# Patient Record
Sex: FEMALE | URBAN - METROPOLITAN AREA
[De-identification: names, ages, dates, MRNs, and addresses within clinical notes are randomized per-mention and may not be internally consistent; named-entity substitution may affect disease eponyms.]

---

## 2018-04-04 ENCOUNTER — INPATIENT (INPATIENT)
Facility: HOSPITAL | Age: 63
LOS: 0 days | Discharge: HOME | DRG: 247 | End: 2018-04-05
Attending: INTERNAL MEDICINE | Admitting: INTERNAL MEDICINE
Payer: COMMERCIAL

## 2018-04-04 VITALS
OXYGEN SATURATION: 99 % | HEART RATE: 95 BPM | TEMPERATURE: 98 F | RESPIRATION RATE: 18 BRPM | SYSTOLIC BLOOD PRESSURE: 141 MMHG | DIASTOLIC BLOOD PRESSURE: 78 MMHG

## 2018-04-04 DIAGNOSIS — Z90.49 ACQUIRED ABSENCE OF OTHER SPECIFIED PARTS OF DIGESTIVE TRACT: Chronic | ICD-10-CM

## 2018-04-04 DIAGNOSIS — I10 ESSENTIAL (PRIMARY) HYPERTENSION: ICD-10-CM

## 2018-04-04 DIAGNOSIS — Z98.891 HISTORY OF UTERINE SCAR FROM PREVIOUS SURGERY: Chronic | ICD-10-CM

## 2018-04-04 DIAGNOSIS — Z98.890 OTHER SPECIFIED POSTPROCEDURAL STATES: Chronic | ICD-10-CM

## 2018-04-04 DIAGNOSIS — E11.9 TYPE 2 DIABETES MELLITUS WITHOUT COMPLICATIONS: ICD-10-CM

## 2018-04-04 DIAGNOSIS — E83.39 OTHER DISORDERS OF PHOSPHORUS METABOLISM: ICD-10-CM

## 2018-04-04 DIAGNOSIS — I24.9 ACUTE ISCHEMIC HEART DISEASE, UNSPECIFIED: ICD-10-CM

## 2018-04-04 DIAGNOSIS — E78.5 HYPERLIPIDEMIA, UNSPECIFIED: ICD-10-CM

## 2018-04-04 DIAGNOSIS — H40.9 UNSPECIFIED GLAUCOMA: ICD-10-CM

## 2018-04-04 LAB
ALBUMIN SERPL ELPH-MCNC: 4.8 G/DL — SIGNIFICANT CHANGE UP (ref 3.3–5)
ALP SERPL-CCNC: 109 U/L — SIGNIFICANT CHANGE UP (ref 40–120)
ALT FLD-CCNC: 45 U/L — SIGNIFICANT CHANGE UP (ref 10–45)
ANION GAP SERPL CALC-SCNC: 18 MMOL/L — HIGH (ref 5–17)
APTT BLD: 31 SEC — SIGNIFICANT CHANGE UP (ref 27.5–37.4)
AST SERPL-CCNC: 60 U/L — HIGH (ref 10–40)
BASOPHILS NFR BLD AUTO: 0.5 % — SIGNIFICANT CHANGE UP (ref 0–2)
BILIRUB SERPL-MCNC: 1.3 MG/DL — HIGH (ref 0.2–1.2)
BUN SERPL-MCNC: 29 MG/DL — HIGH (ref 7–23)
CALCIUM SERPL-MCNC: 10.4 MG/DL — SIGNIFICANT CHANGE UP (ref 8.4–10.5)
CHLORIDE SERPL-SCNC: 96 MMOL/L — SIGNIFICANT CHANGE UP (ref 96–108)
CHOLEST SERPL-MCNC: 223 MG/DL — HIGH (ref 10–199)
CK MB CFR SERPL CALC: 1.5 NG/ML — SIGNIFICANT CHANGE UP (ref 0–6.7)
CK SERPL-CCNC: 78 U/L — SIGNIFICANT CHANGE UP (ref 25–170)
CO2 SERPL-SCNC: 21 MMOL/L — LOW (ref 22–31)
CREAT SERPL-MCNC: 1.16 MG/DL — SIGNIFICANT CHANGE UP (ref 0.5–1.3)
EOSINOPHIL NFR BLD AUTO: 3.4 % — SIGNIFICANT CHANGE UP (ref 0–6)
GLUCOSE BLDC GLUCOMTR-MCNC: 193 MG/DL — HIGH (ref 70–99)
GLUCOSE BLDC GLUCOMTR-MCNC: 194 MG/DL — HIGH (ref 70–99)
GLUCOSE BLDC GLUCOMTR-MCNC: 332 MG/DL — HIGH (ref 70–99)
GLUCOSE SERPL-MCNC: 202 MG/DL — HIGH (ref 70–99)
HBA1C BLD-MCNC: 8.6 % — HIGH (ref 4–5.6)
HCT VFR BLD CALC: 37.5 % — SIGNIFICANT CHANGE UP (ref 34.5–45)
HDLC SERPL-MCNC: 35 MG/DL — LOW (ref 40–125)
HGB BLD-MCNC: 12.5 G/DL — SIGNIFICANT CHANGE UP (ref 11.5–15.5)
INR BLD: 1.01 — SIGNIFICANT CHANGE UP (ref 0.88–1.16)
LIPID PNL WITH DIRECT LDL SERPL: 63 MG/DL — SIGNIFICANT CHANGE UP
LYMPHOCYTES # BLD AUTO: 35.2 % — SIGNIFICANT CHANGE UP (ref 13–44)
MAGNESIUM SERPL-MCNC: 2.5 MG/DL — SIGNIFICANT CHANGE UP (ref 1.6–2.6)
MCHC RBC-ENTMCNC: 29.3 PG — SIGNIFICANT CHANGE UP (ref 27–34)
MCHC RBC-ENTMCNC: 33.3 G/DL — SIGNIFICANT CHANGE UP (ref 32–36)
MCV RBC AUTO: 87.8 FL — SIGNIFICANT CHANGE UP (ref 80–100)
MONOCYTES NFR BLD AUTO: 8.1 % — SIGNIFICANT CHANGE UP (ref 2–14)
NEUTROPHILS NFR BLD AUTO: 52.8 % — SIGNIFICANT CHANGE UP (ref 43–77)
PHOSPHATE SERPL-MCNC: 4.2 MG/DL — SIGNIFICANT CHANGE UP (ref 2.5–4.5)
PLATELET # BLD AUTO: 369 K/UL — SIGNIFICANT CHANGE UP (ref 150–400)
POTASSIUM SERPL-MCNC: 4.3 MMOL/L — SIGNIFICANT CHANGE UP (ref 3.5–5.3)
POTASSIUM SERPL-SCNC: 4.3 MMOL/L — SIGNIFICANT CHANGE UP (ref 3.5–5.3)
PROT SERPL-MCNC: 8.2 G/DL — SIGNIFICANT CHANGE UP (ref 6–8.3)
PROTHROM AB SERPL-ACNC: 11.2 SEC — SIGNIFICANT CHANGE UP (ref 9.8–12.7)
RBC # BLD: 4.27 M/UL — SIGNIFICANT CHANGE UP (ref 3.8–5.2)
RBC # FLD: 13.5 % — SIGNIFICANT CHANGE UP (ref 10.3–16.9)
SODIUM SERPL-SCNC: 135 MMOL/L — SIGNIFICANT CHANGE UP (ref 135–145)
TOTAL CHOLESTEROL/HDL RATIO MEASUREMENT: 6.4 RATIO — SIGNIFICANT CHANGE UP (ref 3.3–7.1)
TRIGL SERPL-MCNC: 625 MG/DL — HIGH (ref 10–149)
WBC # BLD: 8.3 K/UL — SIGNIFICANT CHANGE UP (ref 3.8–10.5)
WBC # FLD AUTO: 8.3 K/UL — SIGNIFICANT CHANGE UP (ref 3.8–10.5)

## 2018-04-04 PROCEDURE — 92928 PRQ TCAT PLMT NTRAC ST 1 LES: CPT | Mod: LC

## 2018-04-04 PROCEDURE — 93571 IV DOP VEL&/PRESS C FLO 1ST: CPT | Mod: 26,LD

## 2018-04-04 PROCEDURE — 93454 CORONARY ARTERY ANGIO S&I: CPT | Mod: 26,XU

## 2018-04-04 PROCEDURE — 99223 1ST HOSP IP/OBS HIGH 75: CPT

## 2018-04-04 PROCEDURE — 93010 ELECTROCARDIOGRAM REPORT: CPT | Mod: 76

## 2018-04-04 RX ORDER — DEXTROSE 50 % IN WATER 50 %
1 SYRINGE (ML) INTRAVENOUS ONCE
Refills: 0 | Status: DISCONTINUED | OUTPATIENT
Start: 2018-04-04 | End: 2018-04-04

## 2018-04-04 RX ORDER — GLUCAGON INJECTION, SOLUTION 0.5 MG/.1ML
1 INJECTION, SOLUTION SUBCUTANEOUS ONCE
Refills: 0 | Status: DISCONTINUED | OUTPATIENT
Start: 2018-04-04 | End: 2018-04-04

## 2018-04-04 RX ORDER — SODIUM CHLORIDE 9 MG/ML
500 INJECTION INTRAMUSCULAR; INTRAVENOUS; SUBCUTANEOUS
Refills: 0 | Status: DISCONTINUED | OUTPATIENT
Start: 2018-04-04 | End: 2018-04-04

## 2018-04-04 RX ORDER — DEXTROSE 50 % IN WATER 50 %
12.5 SYRINGE (ML) INTRAVENOUS ONCE
Refills: 0 | Status: DISCONTINUED | OUTPATIENT
Start: 2018-04-04 | End: 2018-04-04

## 2018-04-04 RX ORDER — CLOPIDOGREL BISULFATE 75 MG/1
75 TABLET, FILM COATED ORAL DAILY
Refills: 0 | Status: DISCONTINUED | OUTPATIENT
Start: 2018-04-05 | End: 2018-04-05

## 2018-04-04 RX ORDER — SODIUM CHLORIDE 9 MG/ML
1000 INJECTION, SOLUTION INTRAVENOUS
Refills: 0 | Status: DISCONTINUED | OUTPATIENT
Start: 2018-04-04 | End: 2018-04-04

## 2018-04-04 RX ORDER — GLUCAGON INJECTION, SOLUTION 0.5 MG/.1ML
1 INJECTION, SOLUTION SUBCUTANEOUS ONCE
Refills: 0 | Status: DISCONTINUED | OUTPATIENT
Start: 2018-04-04 | End: 2018-04-05

## 2018-04-04 RX ORDER — HEPARIN SODIUM 5000 [USP'U]/ML
7500 INJECTION INTRAVENOUS; SUBCUTANEOUS EVERY 8 HOURS
Refills: 0 | Status: DISCONTINUED | OUTPATIENT
Start: 2018-04-04 | End: 2018-04-05

## 2018-04-04 RX ORDER — SODIUM CHLORIDE 9 MG/ML
500 INJECTION INTRAMUSCULAR; INTRAVENOUS; SUBCUTANEOUS
Refills: 0 | Status: DISCONTINUED | OUTPATIENT
Start: 2018-04-04 | End: 2018-04-05

## 2018-04-04 RX ORDER — ASPIRIN/CALCIUM CARB/MAGNESIUM 324 MG
81 TABLET ORAL DAILY
Refills: 0 | Status: DISCONTINUED | OUTPATIENT
Start: 2018-04-05 | End: 2018-04-05

## 2018-04-04 RX ORDER — DEXTROSE 50 % IN WATER 50 %
25 SYRINGE (ML) INTRAVENOUS ONCE
Refills: 0 | Status: DISCONTINUED | OUTPATIENT
Start: 2018-04-04 | End: 2018-04-04

## 2018-04-04 RX ORDER — DEXTROSE 50 % IN WATER 50 %
1 SYRINGE (ML) INTRAVENOUS ONCE
Refills: 0 | Status: DISCONTINUED | OUTPATIENT
Start: 2018-04-04 | End: 2018-04-05

## 2018-04-04 RX ORDER — LISINOPRIL 2.5 MG/1
40 TABLET ORAL DAILY
Refills: 0 | Status: DISCONTINUED | OUTPATIENT
Start: 2018-04-04 | End: 2018-04-05

## 2018-04-04 RX ORDER — CHLORHEXIDINE GLUCONATE 213 G/1000ML
1 SOLUTION TOPICAL ONCE
Refills: 0 | Status: DISCONTINUED | OUTPATIENT
Start: 2018-04-04 | End: 2018-04-05

## 2018-04-04 RX ORDER — DEXTROSE 50 % IN WATER 50 %
25 SYRINGE (ML) INTRAVENOUS ONCE
Refills: 0 | Status: DISCONTINUED | OUTPATIENT
Start: 2018-04-04 | End: 2018-04-05

## 2018-04-04 RX ORDER — INSULIN LISPRO 100/ML
VIAL (ML) SUBCUTANEOUS
Refills: 0 | Status: DISCONTINUED | OUTPATIENT
Start: 2018-04-04 | End: 2018-04-04

## 2018-04-04 RX ORDER — NITROGLYCERIN 6.5 MG
0.4 CAPSULE, EXTENDED RELEASE ORAL
Refills: 0 | Status: DISCONTINUED | OUTPATIENT
Start: 2018-04-04 | End: 2018-04-05

## 2018-04-04 RX ORDER — SODIUM CHLORIDE 9 MG/ML
250 INJECTION INTRAMUSCULAR; INTRAVENOUS; SUBCUTANEOUS ONCE
Refills: 0 | Status: COMPLETED | OUTPATIENT
Start: 2018-04-04 | End: 2018-04-04

## 2018-04-04 RX ORDER — GEMFIBROZIL 600 MG
600 TABLET ORAL
Refills: 0 | Status: DISCONTINUED | OUTPATIENT
Start: 2018-04-04 | End: 2018-04-05

## 2018-04-04 RX ORDER — CLOPIDOGREL BISULFATE 75 MG/1
225 TABLET, FILM COATED ORAL ONCE
Refills: 0 | Status: COMPLETED | OUTPATIENT
Start: 2018-04-04 | End: 2018-04-04

## 2018-04-04 RX ORDER — SODIUM CHLORIDE 9 MG/ML
1000 INJECTION, SOLUTION INTRAVENOUS
Refills: 0 | Status: DISCONTINUED | OUTPATIENT
Start: 2018-04-04 | End: 2018-04-05

## 2018-04-04 RX ORDER — INSULIN LISPRO 100/ML
1 VIAL (ML) SUBCUTANEOUS
Refills: 0 | Status: DISCONTINUED | OUTPATIENT
Start: 2018-04-04 | End: 2018-04-05

## 2018-04-04 RX ORDER — FAMOTIDINE 10 MG/ML
20 INJECTION INTRAVENOUS
Refills: 0 | Status: DISCONTINUED | OUTPATIENT
Start: 2018-04-04 | End: 2018-04-05

## 2018-04-04 RX ORDER — CHOLECALCIFEROL (VITAMIN D3) 125 MCG
1000 CAPSULE ORAL DAILY
Refills: 0 | Status: DISCONTINUED | OUTPATIENT
Start: 2018-04-04 | End: 2018-04-05

## 2018-04-04 RX ORDER — LATANOPROST 0.05 MG/ML
1 SOLUTION/ DROPS OPHTHALMIC; TOPICAL AT BEDTIME
Refills: 0 | Status: DISCONTINUED | OUTPATIENT
Start: 2018-04-04 | End: 2018-04-05

## 2018-04-04 RX ORDER — DEXTROSE 50 % IN WATER 50 %
12.5 SYRINGE (ML) INTRAVENOUS ONCE
Refills: 0 | Status: DISCONTINUED | OUTPATIENT
Start: 2018-04-04 | End: 2018-04-05

## 2018-04-04 RX ADMIN — CLOPIDOGREL BISULFATE 225 MILLIGRAM(S): 75 TABLET, FILM COATED ORAL at 17:45

## 2018-04-04 RX ADMIN — SODIUM CHLORIDE 75 MILLILITER(S): 9 INJECTION INTRAMUSCULAR; INTRAVENOUS; SUBCUTANEOUS at 19:00

## 2018-04-04 RX ADMIN — Medication 1 EACH: at 21:35

## 2018-04-04 RX ADMIN — SODIUM CHLORIDE 1000 MILLILITER(S): 9 INJECTION INTRAMUSCULAR; INTRAVENOUS; SUBCUTANEOUS at 18:13

## 2018-04-04 NOTE — H&P ADULT - NSHPLABSRESULTS_GEN_ALL_CORE
12.5   8.3   )-----------( 369      ( 04 Apr 2018 17:31 )             37.5  PT/INR - ( 04 Apr 2018 17:31 )   PT: 11.2 sec;   INR: 1.01          PTT - ( 04 Apr 2018 17:31 )  PTT:31.0 sec 12.5   8.3   )-----------( 369      ( 04 Apr 2018 17:31 )             37.5      PT/INR - ( 04 Apr 2018 17:31 )   PT: 11.2 sec;   INR: 1.01          PTT - ( 04 Apr 2018 17:31 )  PTT:31.0 sec

## 2018-04-04 NOTE — H&P ADULT - FAMILY HISTORY
Child  Still living? Unknown  Family history of MI (myocardial infarction), Age at diagnosis: Age Unknown     Father  Still living? Unknown  Family history of hypertension in father, Age at diagnosis: Age Unknown     Mother  Still living? Unknown  Family history of diabetes mellitus in mother, Age at diagnosis: Age Unknown

## 2018-04-04 NOTE — H&P ADULT - PROBLEM SELECTOR PLAN 1
- currently CP free  - Trops negative X 2 at OSH  - cardiac cath @ [facility] 4/3/18 which revealed 2 V CAD(mLAD 70 %, mLCX 70 %, dRCA 50 % and normal LM)  - ECHO 04/02/18 at [facility] revealed moderate concentric LVH, LVEF 55-60 %, , diastolic filling pattern indicates impaired relaxation, mildly thickened aortic valve and emmy valve, mild MR, mild TR, right ventricular sysytolic pressure 45 mmHg.   - Pt. transferred to [facility] for staged PCI of LCX and LAD with Dr. IVEY. Pt. precath/consented, consent in chart.  -  Pt. loaded with  mg PO X 2 and Plavix 300 mg in OSH. pt. received ASA 81 mg daily and Plavix 75 mg PO X 1 on 4/3/18 and 4/4/18. Pt. loaded with Plavix 225 mg PO X 1 here as d/w Dr. Ivey.  - c/w Aspirin 81 mg daily and plavix 75 mg daily.  - Cr. 1.36, Pt. euvolemic in exam. Pt. given IVP  cc and NS @ 100 cc/hr was continued pre-cath.  - Home furosemide 20 mg currently being held.

## 2018-04-04 NOTE — H&P ADULT - HISTORY OF PRESENT ILLNESS
SKELETON, INCOMPLTE!!  Loaded with ASA, Plavix ?   63 y/o obese female with PMH of HTN, HLD, DM, DM peripheral neuropathy, CAD s/p cardiac cath @ [facility] 4/3/18 which revealed 2 V CAD(mLAD 70 %, mLCX 70 %, dRCA 50 % and normal LM), lumbar disc herniation, glaucoma, vertigo, vit D def.  who initially presented to [facility] 3/31/18 with CC of CP and dizziness. Pt. reports that she had constant 7/10 non-radiating sub sternal chest pressure-like ("as if someone was taking the base of their palm and pushing on her chest") associated with lightheadedness, blurry vision and hearing loss. Pt. reports that the chest pressure was not alleviated/aggravated by anything. Pt. states that her episode of dizziness lasted 1 minute which kept recurring which prompted her to go to the ER. Pt. denied any SOB and palpitations associated with CP. On baseline, pt. reports that she is able to walk 1-2 block and needs to stop walking 2/2 the pain in her back. Pt. denies any CP and SOB in ambulating at baseline. She also reports of peripheral tingling, occasional tinnitus. In ER /98 , -150s, RR 18, T 97.6, O2 98 % RA. Trops negative 0.015 X 2. BNP 13.1, normal TSH and free T4, triglycerides elevated in the 1100s likely 2/2 non-fasting test? , hypophosphatemia phosphate 2.5, HgA1C 9.3 %, orthostatics performed was negative. EKG revealed sinus tachycardia(~125 bpm) without acute ST-T changes, good R wave progression and borderline LAD,  CXRAY 3/31/18 revealed no evidence of an acute pulmonary process. ECHO 04/02/18 at [facility] revealed moderate concentric LVH, LVEF 55-60 %, , diastolic filling pattern indicates impaired relaxation, mildly thickened aortic valve and emmy valve, mild MR, mild TR, right ventricular sysytolic pressure 45 mmHg.  Pt. is now transferred to [facility] for staged PCI of LCx and LaD with Dr. Ayala. On arrival to [facility] Vital signs … Pt. currently denies any…… … CP, SOB, dizziness, diaphoresis, fatigue, LE edema, palpitations, orthopnea, PND, syncope 63 y/o obese female with PMH of HTN, HLD, DM, DM peripheral neuropathy, CAD s/p cardiac cath @ [facility] 4/3/18 which revealed 2 V CAD(mLAD 70 %, mLCX 70 %, dRCA 50 % and normal LM), lumbar disc herniation, glaucoma, vertigo, vit D def.  who initially presented to [facility] 3/31/18 with CC of CP and dizziness.  Pt. reports that she had constant 7/10 non-radiating sub sternal chest pressure-like ("as if someone was taking the base of their palm and pushing on her chest") associated with lightheadedness, blurry vision and hearing loss. Pt. reports that the chest pressure was not alleviated/aggravated by anything. Pt. states that her episode of dizziness lasted 1 minute which kept recurring which prompted her to go to the ER. Pt. denied any SOB and palpitations associated with CP. On baseline, pt. reports that she is able to walk 1-2 block and needs to stop walking 2/2 the pain in her back. Pt. denies any CP and SOB in ambulating at baseline. She also reports of peripheral tingling, occasional tinnitus. In ER /98 , -150s, RR 18, T 97.6, O2 98 % RA. Trops negative 0.015 X 2. BNP 13.1, normal TSH and free T4, triglycerides elevated in the 1100s likely 2/2 non-fasting test? , hypophosphatemia phosphate 2.5, HgA1C 9.3 %, orthostatics performed was negative. EKG revealed sinus tachycardia(~125 bpm) without acute ST-T changes, good R wave progression and borderline LAD,  CXRAY 3/31/18 revealed no evidence of an acute pulmonary process. ECHO 04/02/18 at [facility] revealed moderate concentric LVH, LVEF 55-60 %, , diastolic filling pattern indicates impaired relaxation, mildly thickened aortic valve and emmy valve, mild MR, mild TR, right ventricular sysytolic pressure 45 mmHg.  Pt. was given Metoprolol tartate 25 mg PO X 1 , nitopatch and aspirin 162 mg PO X 2 in ED. Pt. was later loaded with Plavix 300 mg at OSH. Pt. is now transferred to [facility] for staged PCI of LCx and LaD with Dr. Blum. On arrival to [facility] /78, HR 95, RR 18, T 97.5, and O2 99 % RA. Pt. currently denies any CP, SOB, dizziness, diaphoresis,, LE edema, palpitations, orthopnea, PND, syncope 63 y/o obese female with PMH of HTN, HLD, DM 1 on home insulin sliding pump, DM peripheral neuropathy, CAD s/p cardiac cath @ [facility] 4/3/18 which revealed 2 V CAD(mLAD 70 %, mLCX 70 %, dRCA 50 % and normal LM), lumbar disc herniation, glaucoma, vertigo, vit D def.  who initially presented to [facility] 3/31/18 with CC of CP and dizziness.  Pt. reports that she had constant 7/10 non-radiating sub sternal chest pressure-like ("as if someone was taking the base of their palm and pushing on her chest") associated with lightheadedness, blurry vision and hearing loss. Pt. reports that the chest pressure was not alleviated/aggravated by anything. Pt. states that her episode of dizziness lasted 1 minute which kept recurring which prompted her to go to the ER. Pt. denied any SOB and palpitations associated with CP. On baseline, pt. reports that she is able to walk 1-2 block and needs to stop walking 2/2 the pain in her back. Pt. denies any CP and SOB in ambulating at baseline. She also reports of peripheral tingling, occasional tinnitus. In ER /98 , -150s, RR 18, T 97.6, O2 98 % RA. Trops negative 0.015 X 2. BNP 13.1, normal TSH and free T4, triglycerides elevated in the 1100s likely 2/2 non-fasting test? , hypophosphatemia phosphate 2.5, HgA1C 9.3 %, orthostatics performed was negative. EKG revealed sinus tachycardia(~125 bpm) without acute ST-T changes, good R wave progression and borderline LAD,  CXRAY 3/31/18 revealed no evidence of an acute pulmonary process. ECHO 04/02/18 at [facility] revealed moderate concentric LVH, LVEF 55-60 %, , diastolic filling pattern indicates impaired relaxation, mildly thickened aortic valve and emmy valve, mild MR, mild TR, right ventricular sysytolic pressure 45 mmHg.  Pt. was given Metoprolol tartate 25 mg PO X 1 , nitopatch and aspirin 162 mg PO X 2 in ED. Pt. was later loaded with Plavix 300 mg at OSH. Pt. is now transferred to [facility] for staged PCI of LCx and LAD with Dr. Blum. On arrival to [facility] /78, HR 95, RR 18, T 97.5, and O2 99 % RA. Pt. currently denies any CP, SOB, dizziness, diaphoresis,, LE edema, palpitations, orthopnea, PND, syncope

## 2018-04-04 NOTE — PATIENT PROFILE ADULT. - ABILITY TO HEAR (WITH HEARING AID OR HEARING APPLIANCE IF NORMALLY USED):
Mildly to Moderately Impaired: difficulty hearing in some environments or speaker may need to increase volume or speak distinctly/decreased hearing both ears right ear most hearing loss  no hearing aids

## 2018-04-04 NOTE — H&P ADULT - ASSESSMENT
61 y/o obese female with PMH of HTN, HLD, DM, DM peripheral neuropathy, CAD s/p cardiac cath @ [facility] 4/3/18 which revealed 2 V CAD(mLAD 70 %, mLCX 70 %, dRCA 50 % and normal LM), lumbar disc herniation, glaucoma, vertigo, vit D def.  who initially presented to [facility] 3/31/18 with CC of CP and dizziness. Pt. is now transferred to [facility] for staged PCI of LCx and LaD with Dr. Ayala. 61 y/o obese female with PMH of HTN, HLD, DM, DM peripheral neuropathy, CAD s/p cardiac cath @ [facility] 4/3/18 which revealed 2 V CAD(mLAD 70 %, mLCX 70 %, dRCA 50 % and normal LM), lumbar disc herniation, glaucoma, vertigo, vit D def.  who initially presented to [facility] 3/31/18 with CC of CP and dizziness. Pt. is now transferred to [facility] for staged PCI of LCx and LaD with Dr. Blum.    - Cr. 1.36, Pt. euvolemic in exam. Pt. given IVP  cc and NS @ 100 cc/hr was continued pre-cath.   - Pt. loaded with  mg PO X 2 and Plavix 300 mg in OSH. pt. received ASA 81 mg daily and Plavix 75 mg PO X 1 on 4/3/18 and 4/4/18. Pt. loaded with Plavix 225 mg PO X 1 here as d/w Dr. Blum.     Risks & benefits of procedure and alternative therapy have been explained to the patient including but not limited to: allergic reaction, bleeding w/possible need for blood transfusion, infection, renal and vascular compromise, limb damage, arrhythmia, stroke, vessel dissection/perforation, Myocardial infarction, emergent CABG. Informed consent obtained and in chart

## 2018-04-04 NOTE — H&P ADULT - PROBLEM SELECTOR PLAN 5
-hypophosphatemia phosphate 2.5 at OSH, neutra phos 1 packet TID X 3 doses given at OSH,   - f/u phosphorous level

## 2018-04-04 NOTE — H&P ADULT - PROBLEM SELECTOR PLAN 3
-Hg A1C 9.8 % at OSH  - f/u HgA1c am  - ISS -Hg A1C 9.8 % at OSH  - f/u HgA1c am  - Pt. on Insulin sliding pump at home, -Hg A1C 9.8 % at OSH  - f/u HgA1c am  - Pt. on Insulin sliding pump at home, pt. setting in machine were not very clear, endocrinology fellow ([phone]) consulted and recommended numbers is set up on sunrise. pt. very comfortable with using insulin pump and wishes to use it. CALL endocrinology fellow with any questions.   - miscellaneous order put in that pt. can insulin bolus her self and form for self management of insulin pump has been signed by pt.   - fingerstick before meals and at bedtime ordered.

## 2018-04-04 NOTE — H&P ADULT - PMH
Acute coronary syndrome    Diabetes    Glaucoma    Hyperlipidemia    Hypertension    Hypophosphatemia

## 2018-04-04 NOTE — H&P ADULT - PROBLEM SELECTOR PLAN 4
- triglycerides elevated in the 1100s at OSH likely 2/2 non-fasting test?  - home rosuvastatin currently being held; pt. started on gemfibrozil at OSH  - f/u lipid profile

## 2018-04-04 NOTE — H&P ADULT - PROBLEM SELECTOR PLAN 6
- c/w - c/w lantaprost  - lumigan and combigan (home meds, not available at hospital)    DVT PPX; Hep SubQ

## 2018-04-05 ENCOUNTER — TRANSCRIPTION ENCOUNTER (OUTPATIENT)
Age: 63
End: 2018-04-05

## 2018-04-05 VITALS
RESPIRATION RATE: 16 BRPM | SYSTOLIC BLOOD PRESSURE: 137 MMHG | DIASTOLIC BLOOD PRESSURE: 64 MMHG | HEART RATE: 84 BPM | OXYGEN SATURATION: 98 %

## 2018-04-05 LAB
ANION GAP SERPL CALC-SCNC: 13 MMOL/L — SIGNIFICANT CHANGE UP (ref 5–17)
ANION GAP SERPL CALC-SCNC: 17 MMOL/L — SIGNIFICANT CHANGE UP (ref 5–17)
BUN SERPL-MCNC: 26 MG/DL — HIGH (ref 7–23)
BUN SERPL-MCNC: 27 MG/DL — HIGH (ref 7–23)
CALCIUM SERPL-MCNC: 9.4 MG/DL — SIGNIFICANT CHANGE UP (ref 8.4–10.5)
CALCIUM SERPL-MCNC: 9.7 MG/DL — SIGNIFICANT CHANGE UP (ref 8.4–10.5)
CHLORIDE SERPL-SCNC: 94 MMOL/L — LOW (ref 96–108)
CHLORIDE SERPL-SCNC: 94 MMOL/L — LOW (ref 96–108)
CO2 SERPL-SCNC: 19 MMOL/L — LOW (ref 22–31)
CO2 SERPL-SCNC: 23 MMOL/L — SIGNIFICANT CHANGE UP (ref 22–31)
CREAT SERPL-MCNC: 1.08 MG/DL — SIGNIFICANT CHANGE UP (ref 0.5–1.3)
CREAT SERPL-MCNC: 1.21 MG/DL — SIGNIFICANT CHANGE UP (ref 0.5–1.3)
GLUCOSE BLDC GLUCOMTR-MCNC: 264 MG/DL — HIGH (ref 70–99)
GLUCOSE BLDC GLUCOMTR-MCNC: 314 MG/DL — HIGH (ref 70–99)
GLUCOSE BLDC GLUCOMTR-MCNC: 422 MG/DL — HIGH (ref 70–99)
GLUCOSE BLDC GLUCOMTR-MCNC: 465 MG/DL — CRITICAL HIGH (ref 70–99)
GLUCOSE BLDC GLUCOMTR-MCNC: 479 MG/DL — CRITICAL HIGH (ref 70–99)
GLUCOSE SERPL-MCNC: 327 MG/DL — HIGH (ref 70–99)
GLUCOSE SERPL-MCNC: 510 MG/DL — CRITICAL HIGH (ref 70–99)
HBA1C BLD-MCNC: 8.6 % — HIGH (ref 4–5.6)
HCT VFR BLD CALC: 33.4 % — LOW (ref 34.5–45)
HGB BLD-MCNC: 10.8 G/DL — LOW (ref 11.5–15.5)
MAGNESIUM SERPL-MCNC: 2.3 MG/DL — SIGNIFICANT CHANGE UP (ref 1.6–2.6)
MAGNESIUM SERPL-MCNC: 2.5 MG/DL — SIGNIFICANT CHANGE UP (ref 1.6–2.6)
MCHC RBC-ENTMCNC: 28.8 PG — SIGNIFICANT CHANGE UP (ref 27–34)
MCHC RBC-ENTMCNC: 32.3 G/DL — SIGNIFICANT CHANGE UP (ref 32–36)
MCV RBC AUTO: 89.1 FL — SIGNIFICANT CHANGE UP (ref 80–100)
PLATELET # BLD AUTO: 308 K/UL — SIGNIFICANT CHANGE UP (ref 150–400)
POTASSIUM SERPL-MCNC: 4.6 MMOL/L — SIGNIFICANT CHANGE UP (ref 3.5–5.3)
POTASSIUM SERPL-MCNC: 4.6 MMOL/L — SIGNIFICANT CHANGE UP (ref 3.5–5.3)
POTASSIUM SERPL-SCNC: 4.6 MMOL/L — SIGNIFICANT CHANGE UP (ref 3.5–5.3)
POTASSIUM SERPL-SCNC: 4.6 MMOL/L — SIGNIFICANT CHANGE UP (ref 3.5–5.3)
RBC # BLD: 3.75 M/UL — LOW (ref 3.8–5.2)
RBC # FLD: 13.4 % — SIGNIFICANT CHANGE UP (ref 10.3–16.9)
SODIUM SERPL-SCNC: 130 MMOL/L — LOW (ref 135–145)
SODIUM SERPL-SCNC: 130 MMOL/L — LOW (ref 135–145)
WBC # BLD: 7.3 K/UL — SIGNIFICANT CHANGE UP (ref 3.8–10.5)
WBC # FLD AUTO: 7.3 K/UL — SIGNIFICANT CHANGE UP (ref 3.8–10.5)

## 2018-04-05 PROCEDURE — 97161 PT EVAL LOW COMPLEX 20 MIN: CPT

## 2018-04-05 PROCEDURE — C1874: CPT

## 2018-04-05 PROCEDURE — 80053 COMPREHEN METABOLIC PANEL: CPT

## 2018-04-05 PROCEDURE — 85027 COMPLETE CBC AUTOMATED: CPT

## 2018-04-05 PROCEDURE — 82550 ASSAY OF CK (CPK): CPT

## 2018-04-05 PROCEDURE — 83735 ASSAY OF MAGNESIUM: CPT

## 2018-04-05 PROCEDURE — 82553 CREATINE MB FRACTION: CPT

## 2018-04-05 PROCEDURE — C1725: CPT

## 2018-04-05 PROCEDURE — 36415 COLL VENOUS BLD VENIPUNCTURE: CPT

## 2018-04-05 PROCEDURE — C1769: CPT

## 2018-04-05 PROCEDURE — 99239 HOSP IP/OBS DSCHRG MGMT >30: CPT

## 2018-04-05 PROCEDURE — 85025 COMPLETE CBC W/AUTO DIFF WBC: CPT

## 2018-04-05 PROCEDURE — C1887: CPT

## 2018-04-05 PROCEDURE — 85730 THROMBOPLASTIN TIME PARTIAL: CPT

## 2018-04-05 PROCEDURE — 80048 BASIC METABOLIC PNL TOTAL CA: CPT

## 2018-04-05 PROCEDURE — 82962 GLUCOSE BLOOD TEST: CPT

## 2018-04-05 PROCEDURE — C1889: CPT

## 2018-04-05 PROCEDURE — 85610 PROTHROMBIN TIME: CPT

## 2018-04-05 PROCEDURE — 83036 HEMOGLOBIN GLYCOSYLATED A1C: CPT

## 2018-04-05 PROCEDURE — 80061 LIPID PANEL: CPT

## 2018-04-05 PROCEDURE — 84100 ASSAY OF PHOSPHORUS: CPT

## 2018-04-05 PROCEDURE — 84681 ASSAY OF C-PEPTIDE: CPT

## 2018-04-05 PROCEDURE — 93005 ELECTROCARDIOGRAM TRACING: CPT

## 2018-04-05 RX ORDER — METOPROLOL TARTRATE 50 MG
25 TABLET ORAL DAILY
Refills: 0 | Status: DISCONTINUED | OUTPATIENT
Start: 2018-04-05 | End: 2018-04-05

## 2018-04-05 RX ORDER — CLOPIDOGREL BISULFATE 75 MG/1
1 TABLET, FILM COATED ORAL
Qty: 30 | Refills: 11
Start: 2018-04-05 | End: 2019-03-30

## 2018-04-05 RX ORDER — ASPIRIN/CALCIUM CARB/MAGNESIUM 324 MG
1 TABLET ORAL
Qty: 30 | Refills: 11
Start: 2018-04-05 | End: 2019-03-30

## 2018-04-05 RX ORDER — METOPROLOL TARTRATE 50 MG
1 TABLET ORAL
Qty: 30 | Refills: 3
Start: 2018-04-05 | End: 2018-08-02

## 2018-04-05 RX ORDER — INSULIN LISPRO 100/ML
8 VIAL (ML) SUBCUTANEOUS ONCE
Refills: 0 | Status: COMPLETED | OUTPATIENT
Start: 2018-04-05 | End: 2018-04-05

## 2018-04-05 RX ADMIN — Medication 25 MILLIGRAM(S): at 13:26

## 2018-04-05 RX ADMIN — HEPARIN SODIUM 7500 UNIT(S): 5000 INJECTION INTRAVENOUS; SUBCUTANEOUS at 13:27

## 2018-04-05 RX ADMIN — CLOPIDOGREL BISULFATE 75 MILLIGRAM(S): 75 TABLET, FILM COATED ORAL at 13:28

## 2018-04-05 RX ADMIN — Medication 1 EACH: at 16:32

## 2018-04-05 RX ADMIN — FAMOTIDINE 20 MILLIGRAM(S): 10 INJECTION INTRAVENOUS at 06:07

## 2018-04-05 RX ADMIN — LISINOPRIL 40 MILLIGRAM(S): 2.5 TABLET ORAL at 06:07

## 2018-04-05 RX ADMIN — HEPARIN SODIUM 7500 UNIT(S): 5000 INJECTION INTRAVENOUS; SUBCUTANEOUS at 06:07

## 2018-04-05 RX ADMIN — Medication 1000 UNIT(S): at 13:26

## 2018-04-05 RX ADMIN — Medication 1 EACH: at 13:28

## 2018-04-05 RX ADMIN — Medication 1 EACH: at 07:24

## 2018-04-05 RX ADMIN — Medication 1 EACH: at 13:29

## 2018-04-05 RX ADMIN — Medication 81 MILLIGRAM(S): at 13:28

## 2018-04-05 RX ADMIN — Medication 600 MILLIGRAM(S): at 09:32

## 2018-04-05 RX ADMIN — Medication 8 UNIT(S): at 13:40

## 2018-04-05 NOTE — DISCHARGE NOTE ADULT - PROVIDER TOKENS
FREE:[LAST:[Newaz],FIRST:[Marj],PHONE:[phone]],FAX:[(   [phone]    -],ADDRESS:[[address] [place] [address]]],FREE:[LAST:[Punduongu],FIRST:[Jamie],PHONE:[phone]:[(   )    -],ADDRESS:[[address] [place] [address]]]

## 2018-04-05 NOTE — DISCHARGE NOTE ADULT - CARE PLAN
Principal Discharge DX:	Acute coronary syndrome  Goal:	You went to [facility] endorsing chest pain and other symptoms. You had a diagnostic cardiac catheterization revealing significant disease and you were transferred to [facility] and got another cardiac catheterization and a drug eluting stent to your 1st OBTUSE MARGINAL coronary artery. You have another blockage in your LEFT ANTERIOR DESCENDING coronary artery  Secondary Diagnosis:	Diabetes  Secondary Diagnosis:	Hyperlipidemia  Secondary Diagnosis:	Hypertension Principal Discharge DX:	Acute coronary syndrome  Goal:	You went to [facility] endorsing chest pain and other symptoms. You had a diagnostic cardiac catheterization revealing significant disease and you were transferred to [facility] and got another cardiac catheterization and a drug eluting stent to your 1st OBTUSE MARGINAL coronary artery. You have another blockage in your LEFT ANTERIOR DESCENDING coronary artery. It is VERY IMPORTANT that you take ASPIRIN 81mg once daily and PLAVIX (CLOPIDOGREL) 75mg once daily.  Secondary Diagnosis:	Diabetes  Secondary Diagnosis:	Hyperlipidemia  Secondary Diagnosis:	Hypertension Principal Discharge DX:	Acute coronary syndrome  Goal:	You went to [facility] endorsing chest pain and other symptoms. You had a diagnostic cardiac catheterization revealing significant disease and you were transferred to [facility] and got another cardiac catheterization and a drug eluting stent to your 1st OBTUSE MARGINAL coronary artery. You have another blockage in your LEFT ANTERIOR DESCENDING coronary artery. It is VERY IMPORTANT that you take ASPIRIN 81mg once daily and PLAVIX (CLOPIDOGREL) 75mg once daily to keep your stent open. If you do not take these medications EVERY SINGLE DAY your stent can close and you can have a heart attack.  Assessment and plan of treatment:	Right wrist wound care instructions: do not lift objects heavier than 5 lbs for 5 days. Observe for signs of bleeding which include bleeding/sudden swelling to your right wrist or numbness/tingling/pain/coolness to your right hand/fingers/forearm. Follow up with your cardiologist Dr. Jamie Staley in 1-2 weeks.     Continue taking Lasix (Furosemide) 20mg once daily for fluid balance/control.  Secondary Diagnosis:	Diabetes  Goal:	Your hemoglobin A1C is 8.6. Keep using your insulin pump and restart your Metformin on Friday 4/6/18.  Assessment and plan of treatment:	Your blood glucose levels were in the 400s during the day on 4/5. We gave you an additional 8 units of insulin in addition to the 20 units you took with your insulin pump. Your blood glucose was 264 prior to discharge. Dr. Up is the endocrinologist who saw you in the hospital. Please follow up with her for better management of your insulin regimen. Please change the pieces of the insulin pump to make sure the insulin you're injecting is getting into your bloodstream successfully.  Secondary Diagnosis:	Hyperlipidemia  Goal:	Continue taking Crestor (Rosuvastatin) 40mg once daily at bedtime for cholesterol control  Secondary Diagnosis:	Hypertension  Goal:	Continue Quinapril 20mg once daily for blood pressure control. This also helps your heart to pump strongly. START taking Metoprolol Succinate 25mg once daily. This affects your blood pressure and helps your heart pump strongly.

## 2018-04-05 NOTE — DISCHARGE NOTE ADULT - HOSPITAL COURSE
61 y/o obese female with PMH of HTN, HLD, DM1 and DM2 with insulin pump at home, DM peripheral neuropathy, CAD s/p cardiac cath @ [facility] 4/3/18 which revealed 2 V CAD(mLAD 70 %, mLCX 70 %, dRCA 50 % and normal LM), lumbar disc herniation, glaucoma, vertigo, vit D def.  who initially presented to [facility] 3/31/18 with CC of CP and dizziness.  Pt. reports that she had constant 7/10 non-radiating sub sternal chest pressure-like ("as if someone was taking the base of their palm and pushing on her chest") associated with lightheadedness, blurry vision and hearing loss. Pt. reports that the chest pressure was not alleviated/aggravated by anything. Pt. states that her episode of dizziness lasted 1 minute which kept recurring which prompted her to go to the ER. Pt. denied any SOB and palpitations associated with CP. On baseline, pt. reports that she is able to walk 1-2 block and needs to stop walking 2/2 the pain in her back. Pt. denies any CP and SOB in ambulating at baseline. She also reports of peripheral tingling, occasional tinnitus. In ER /98 , -150s, RR 18, T 97.6, O2 98 % RA. Trops negative 0.015 X 2. BNP 13.1, normal TSH and free T4, triglycerides elevated in the 1100s likely 2/2 non-fasting test? , hypophosphatemia phosphate 2.5, HgA1C 9.3 %, orthostatics performed was negative. EKG revealed sinus tachycardia(~125 bpm) without acute ST-T changes, good R wave progression and borderline LAD,  CXRAY 3/31/18 revealed no evidence of an acute pulmonary process. ECHO 04/02/18 at [facility] revealed moderate concentric LVH, LVEF 55-60 %, , diastolic filling pattern indicates impaired relaxation, mildly thickened aortic valve and emmy valve, mild MR, mild TR, right ventricular sysytolic pressure 45 mmHg.  Pt. was given Metoprolol tartate 25 mg PO X 1 , nitopatch and aspirin 162 mg PO X 2 in ED. Pt. was later loaded with Plavix 300 mg at OSH. Pt. is now transferred to [facility] for staged PCI of LCx and LaD with Dr. Blum. On arrival to [facility] /78, HR 95, RR 18, T 97.5, and O2 99 % RA. Pt. currently denies any CP, SOB, dizziness, diaphoresis,, LE edema, palpitations, orthopnea, PND, syncope 61 y/o obese female with PMH of HTN, HLD, DM1 and DM2 with insulin pump at home, DM peripheral neuropathy, CAD s/p cardiac cath @ [facility] 4/3/18 which revealed 2 V CAD(mLAD 70 %, mLCX 70 %, dRCA 50 % and normal LM), lumbar disc herniation, glaucoma, vertigo, vit D def.  who initially presented to [facility] 3/31/18 with CC of CP and dizziness.  Pt. reports that she had constant 7/10 non-radiating sub sternal chest pressure-like ("as if someone was taking the base of their palm and pushing on her chest") associated with lightheadedness, blurry vision and hearing loss. Pt. reports that the chest pressure was not alleviated/aggravated by anything. Pt. states that her episode of dizziness lasted 1 minute which kept recurring which prompted her to go to the ER. Pt. denied any SOB and palpitations associated with CP. On baseline, pt. reports that she is able to walk 1-2 block and needs to stop walking 2/2 the pain in her back. Pt. denies any CP and SOB in ambulating at baseline. She also reports of peripheral tingling, occasional tinnitus. In ER /98 , -150s, RR 18, T 97.6, O2 98 % RA. Trops negative 0.015 X 2. BNP 13.1, normal TSH and free T4, triglycerides elevated in the 1100s likely 2/2 non-fasting test? , hypophosphatemia phosphate 2.5, HgA1C 9.3 %, orthostatics performed was negative. EKG revealed sinus tachycardia(~125 bpm) without acute ST-T changes, good R wave progression and borderline LAD,  CXRAY 3/31/18 revealed no evidence of an acute pulmonary process. ECHO 04/02/18 at [facility] revealed moderate concentric LVH, LVEF 55-60 %, , diastolic filling pattern indicates impaired relaxation, mildly thickened aortic valve and emmy valve, mild MR, mild TR, right ventricular sysytolic pressure 45 mmHg.  Pt. was given Metoprolol tartate 25 mg PO X 1 , nitopatch and aspirin 162 mg PO X 2 in ED. Pt. was later loaded with Plavix 300 mg at OSH. Pt. is now transferred to [facility] for staged PCI of LCx and LaD with Dr. Blum. On arrival to [facility] /78, HR 95, RR 18, T  97.5, and O2 99 % RA. Pt. currently denies any CP, SOB, dizziness, diaphoresis,, LE edema, palpitations, orthopnea, PND, syncope. Pt now s/p cardiac cath on 4/4/18 with JR OM1 (80-90%). Residual 70 % pLAD positive by FFR 0.76. EF 55-60 % by ECHO.  Right radial site stable. Pt to return for staged PCI of pLAD in 4-6 weeks with Dr. Blum (pt. wishes to have her procedure done with him; Dr. Blum aware). Pt discharged on Aspirin/Plavix/Quinipril 20mg daily/Toprol XL 25mg daily/Crestor 40mg daily at bedtime. Pt given information to follow up with Dr. Jamie Staley. Pt's blood glucose was elevated to 400s on 4/4 around lunchtime. Pt injected a total of 20 units of lispro from her insulin pump and was given an addition 8 units of humalog by [facility] nurse as per Endocrinology consults recommendations and pt's blood glucose was 264 prior to discharge. Pt was told to restart her Metformin on 4/6 and to follow up with endocrinology as an outpt and given their contact information.

## 2018-04-05 NOTE — DISCHARGE NOTE ADULT - MEDICATION SUMMARY - MEDICATIONS TO TAKE
I will START or STAY ON the medications listed below when I get home from the hospital:    Aspirin Enteric Coated 81 mg oral delayed release tablet  -- 1 tab(s) by mouth once a day   -- Swallow whole.  Do not crush.  Take with food or milk.    -- Indication: For Coronary artery disease/ KEEPING YOUR STENT OPEN    quinapril 20 mg oral tablet  -- 1 tab(s) by mouth once a day  -- Indication: For Blood pressure control/keeping your heart pumping strongly    metFORMIN 1000 mg oral tablet  -- 1 tab(s) by mouth 2 times a day  -- Indication: For Diabetes    rosuvastatin 40 mg oral tablet  -- tab(s) by mouth once a day (at bedtime)  -- Indication: For Cholesterol control    Lopid 600 mg oral tablet  -- 1 tab(s) by mouth 2 times a day  -- Indication: For Cholesterol control    Plavix 75 mg oral tablet  -- 1 tab(s) by mouth once a day   -- Indication: For Coronary artery disease/ KEEPING YOUR STENT OPEN    metoprolol succinate 25 mg oral tablet, extended release  -- 1 tab(s) by mouth once a day  -- Indication: For Blood pressure control    Lasix 20 mg oral tablet  -- 1 tab(s) by mouth once a day  -- Indication: For Blood pressure control/diuretic/water pill    Pepcid 20 mg oral tablet  -- 1 tab(s) by mouth 2 times a day  -- Indication: For Stomach acid control    brimonidine-timolol 0.2%-0.5% ophthalmic solution  -- to each affected eye once a day  -- Indication: For Eye drops    bimatoprost 0.01% ophthalmic solution  -- Indication: For Eye drops    cholecalciferol 2000 intl units oral tablet  -- 1 tab(s) by mouth once a day  -- Indication: For Vitamin

## 2018-04-05 NOTE — DISCHARGE NOTE ADULT - CARE PROVIDER_API CALL
Marj Up  [address] [address]  Phone: [phone]  Fax: (   )    -    Jamie Staley  [address] [place] [address]  Phone: [phone]  Fax: (   )    -

## 2018-04-05 NOTE — DISCHARGE NOTE ADULT - PLAN OF CARE
You went to [place] endorsing chest pain and other symptoms. You had a diagnostic cardiac catheterization revealing significant disease and you were transferred to [facility] and got another cardiac catheterization and a drug eluting stent to your 1st OBTUSE MARGINAL coronary artery. You have another blockage in your LEFT ANTERIOR DESCENDING coronary artery You went to [place] endorsing chest pain and other symptoms. You had a diagnostic cardiac catheterization revealing significant disease and you were transferred to [facility] and got another cardiac catheterization and a drug eluting stent to your 1st OBTUSE MARGINAL coronary artery. You have another blockage in your LEFT ANTERIOR DESCENDING coronary artery. It is VERY IMPORTANT that you take ASPIRIN 81mg once daily and PLAVIX (CLOPIDOGREL) 75mg once daily. You went to [place] endorsing chest pain and other symptoms. You had a diagnostic cardiac catheterization revealing significant disease and you were transferred to [facility] and got another cardiac catheterization and a drug eluting stent to your 1st OBTUSE MARGINAL coronary artery. You have another blockage in your LEFT ANTERIOR DESCENDING coronary artery. It is VERY IMPORTANT that you take ASPIRIN 81mg once daily and PLAVIX (CLOPIDOGREL) 75mg once daily to keep your stent open. If you do not take these medications EVERY SINGLE DAY your stent can close and you can have a heart attack. Right wrist wound care instructions: do not lift objects heavier than 5 lbs for 5 days. Observe for signs of bleeding which include bleeding/sudden swelling to your right wrist or numbness/tingling/pain/coolness to your right hand/fingers/forearm. Follow up with your cardiologist Dr. Jamie Staley in 1-2 weeks.     Continue taking Lasix (Furosemide) 20mg once daily for fluid balance/control. Your hemoglobin A1C is 8.6. Keep using your insulin pump and restart your Metformin on Friday 4/6/18. Your blood glucose levels were in the 400s during the day on 4/5. We gave you an additional 8 units of insulin in addition to the 20 units you took with your insulin pump. Your blood glucose was 264 prior to discharge. Dr. Up is the endocrinologist who saw you in the hospital. Please follow up with her for better management of your insulin regimen. Please change the pieces of the insulin pump to make sure the insulin you're injecting is getting into your bloodstream successfully. Continue taking Crestor (Rosuvastatin) 40mg once daily at bedtime for cholesterol control Continue Quinapril 20mg once daily for blood pressure control. This also helps your heart to pump strongly. START taking Metoprolol Succinate 25mg once daily. This affects your blood pressure and helps your heart pump strongly.

## 2018-04-05 NOTE — CONSULT NOTE ADULT - ASSESSMENT
Agree with SOAP & discussed in detail ,with endocrine team ,patient & primary team.She is obese,has insulin resistance,HASHD,significant CAD ,S/P PCI & scheduled for PCI again in near future.

## 2018-04-05 NOTE — CONSULT NOTE ADULT - SUBJECTIVE AND OBJECTIVE BOX
HPI: 62yFemale    Age at Dx:  How dx:  Hx and duration of insulin:  Current Therapy:  Hx of hypoglycemia  Hx of DKA/HHS?    Home FSG:  Fasting  Lunch  Dinner  Bed    Hx of other regimens  Complications:  Outpatient Endo:    PMH & Surgical Hx:PCI OF LCX AND LAD  No h/o HF  Unknown h/o HF  Family history of diabetes mellitus in mother (Mother)  Family history of hypertension in father (Father)  Family history of MI (myocardial infarction) (Child)  Handoff  MEWS Score  Glaucoma  Hypophosphatemia  Diabetes  Hyperlipidemia  Hypertension  Acute coronary syndrome  Acute coronary syndrome  Glaucoma  Hypophosphatemia  Hyperlipidemia  Diabetes  Hypertension  Acute coronary syndrome  History of cholecystectomy  S/P appendectomy  History of   Hypertension  Hyperlipidemia  Diabetes      FH:  DM:  Thyroid:  Autoimmune:  Other:    SH:  Smoking  Etoh:  Recreational Drugs:  Social Life:    Home Meds:    Current Meds:  aspirin enteric coated 81 milliGRAM(s) Oral daily  chlorhexidine 4% Liquid 1 Application(s) Topical once  cholecalciferol 1000 Unit(s) Oral daily  clopidogrel Tablet 75 milliGRAM(s) Oral daily  dextrose 5%. 1000 milliLiter(s) IV Continuous <Continuous>  dextrose 50% Injectable 12.5 Gram(s) IV Push once  dextrose 50% Injectable 25 Gram(s) IV Push once  dextrose 50% Injectable 25 Gram(s) IV Push once  dextrose Gel 1 Dose(s) Oral once PRN  famotidine    Tablet 20 milliGRAM(s) Oral two times a day  gemfibrozil 600 milliGRAM(s) Oral two times a day  glucagon  Injectable 1 milliGRAM(s) IntraMuscular once PRN  heparin  Injectable 7500 Unit(s) SubCutaneous every 8 hours  insulin lispro (HumaLOG) Pump 1 Each SubCutaneous Continuous Pump  latanoprost 0.005% Ophthalmic Solution 1 Drop(s) Both EYES at bedtime  lisinopril 40 milliGRAM(s) Oral daily  metoprolol succinate ER 25 milliGRAM(s) Oral daily  nitroglycerin     SubLingual 0.4 milliGRAM(s) SubLingual every 5 minutes PRN  sodium chloride 0.9%. 500 milliLiter(s) IV Continuous <Continuous>      Allergies:  No Known Allergies      ROS:  Denies the following except as indicated.    General: weight loss/weight gain, decreased appetite, fatigue  Eyes: Blurry vision, double vision, visual changes  ENT: Throat pain, changes in voice,   CV: palpitations, SOB, CP, cough  GI: NVD, difficulty swallowing, abdominal pain  : polyuria, dysuria  Endo: abnormal menses, temperature intolerance, decreased libido  MSK: weakness, joint pain  Skin: rash, dryness, diaphoresis  Heme: Easy bruising,bleeding  Neuro: HA, dizziness, lightheadedness, numbness tingling  Psych: Anxiety, Depression    Vital Signs Last 24 Hrs  T(C): 36.3 (2018 14:03), Max: 37 (2018 21:50)  T(F): 97.3 (2018 14:03), Max: 98.6 (2018 21:50)  HR: 78 (:24) (70 - 92)  BP: 171/75 (:24) (119/64 - 187/77)  BP(mean): --  RR: 16 (:24) (16 - 18)  SpO2: 96% (:24) (96% - 98%)  Height (cm): 157.48 ( @ 19:40)  Weight (kg): 99.7 ( @ 19:40)  BMI (kg/m2): 40.2 ( @ 19:40)      Constitutional: wn/wd in NAD.   HEENT: NCAT, MMM, OP clear, EOMI, , no proptosis or lid retraction  Neck: no thyromegaly or palpable thyroid nodules   Respiratory: lungs CTAB.  Cardiovascular: regular rhythm, normal S1 and S2, no audible murmurs, no peripheral edema  GI: soft, NT/ND, no masses/HSM appreciated.  Neurology: no tremors, DTR 2+  Skin: no visible rashes/lesions  Psychiatric: AAO x 3, normal affect/mood.  Ext: radial pulses intact, DP pulses intact, extremities warm, no cyanosis, clubbing or edema.       LABS:                        10.8   7.3   )-----------( 308      ( 2018 10:53 )             33.4     04-    130<L>  |  94<L>  |  26<H>  ----------------------------<  327<H>  4.6   |  23  |  1.21    Ca    9.7      2018 14:16  Phos  4.2     -  Mg     2.3     -    TPro  8.2  /  Alb  4.8  /  TBili  1.3<H>  /  DBili  x   /  AST  60<H>  /  ALT  45  /  AlkPhos  109  -    PT/INR - ( 2018 17:31 )   PT: 11.2 sec;   INR: 1.01          PTT - ( 2018 17:31 )  PTT:31.0 sec    Hemoglobin A1C, Whole Blood: 8.6 ( @ 07:03)  Hemoglobin A1C, Whole Blood: 8.6 ( @ 17:31)        RADIOLOGY & ADDITIONAL STUDIES:    CAPILLARY BLOOD GLUCOSE      A/P:62y Female    1.  DM  Please continue lantus       units at night / morning.  Please continue lispro      units before each meal.  Please continue lispro moderate / low dose sliding scale four times daily with meals and at bedtime    Pt's fingerstick glucose goal is     Will continue to monitor     For discharge, pt can continue    Pt can follow up at discharge with [facility] by calling  to make an appointment.   Will discuss case with     and update primary team HPI: 61 yo F w/ PMH of DM 1, CAD admitted s/p PCI and JR. Patient has been hyperglycemic to 400s today. She has kept her pump on through the cath and overnight as well. She was diagnosed at age 37. She has been using insulin fo many years and has been on medtronic pump for 8 years. When she was diagnosed, she was told she had low insulin reserves but she initially was on oral agents. She has not seen an endocrinologist for few years. She has been looking to establish care. Her diabetes has not been well-controlled. She is very labile and also insulin resistant. She was started on Metformin 2 months ago to improve her insulin sensitivity. She has also tried other agents such as invokana which has not helped. Her fsg are 200 or above at home. She diabetic retinopathy but not neuropathy. She has never been in DKA or HHS.    Pump settings:  basal:  12AM-4 PM 2.5 units/hr  4 PM- 12 AM 2.35 units/hr    I:C  5  ISF 20  BG target 100-120    PMH & Surgical Hx:  Glaucoma  Hypophosphatemia  Diabetes  Hyperlipidemia  Hypertension  Acute coronary syndrome    FH:  DM: mother and most of her mothers family.    SH:  Smoking: none  Etoh:occasional    Home Meds:  Home Medications:  bimatoprost 0.01% ophthalmic solution:  (04 Apr 2018 18:24)  brimonidine-timolol 0.2%-0.5% ophthalmic solution: to each affected eye once a day (04 Apr 2018 18:24)  cholecalciferol 2000 intl units oral tablet: 1 tab(s) orally once a day (04 Apr 2018 18:24)  Lasix 20 mg oral tablet: 1 tab(s) orally once a day (04 Apr 2018 18:24)  Lopid 600 mg oral tablet: 1 tab(s) orally 2 times a day (04 Apr 2018 18:24)  metFORMIN 1000 mg oral tablet: 1 tab(s) orally 2 times a day (04 Apr 2018 18:24)  Pepcid 20 mg oral tablet: 1 tab(s) orally 2 times a day (04 Apr 2018 18:24)  quinapril 20 mg oral tablet: 1 tab(s) orally once a day (04 Apr 2018 18:24)  rosuvastatin 40 mg oral tablet: tab(s) orally once a day (at bedtime) (04 Apr 2018 18:24)    Current Meds:  aspirin enteric coated 81 milliGRAM(s) Oral daily  chlorhexidine 4% Liquid 1 Application(s) Topical once  cholecalciferol 1000 Unit(s) Oral daily  clopidogrel Tablet 75 milliGRAM(s) Oral daily  dextrose 5%. 1000 milliLiter(s) IV Continuous <Continuous>  dextrose 50% Injectable 12.5 Gram(s) IV Push once  dextrose 50% Injectable 25 Gram(s) IV Push once  dextrose 50% Injectable 25 Gram(s) IV Push once  dextrose Gel 1 Dose(s) Oral once PRN  famotidine    Tablet 20 milliGRAM(s) Oral two times a day  gemfibrozil 600 milliGRAM(s) Oral two times a day  glucagon  Injectable 1 milliGRAM(s) IntraMuscular once PRN  heparin  Injectable 7500 Unit(s) SubCutaneous every 8 hours  insulin lispro (HumaLOG) Pump 1 Each SubCutaneous Continuous Pump  latanoprost 0.005% Ophthalmic Solution 1 Drop(s) Both EYES at bedtime  lisinopril 40 milliGRAM(s) Oral daily  metoprolol succinate ER 25 milliGRAM(s) Oral daily  nitroglycerin     SubLingual 0.4 milliGRAM(s) SubLingual every 5 minutes PRN  sodium chloride 0.9%. 500 milliLiter(s) IV Continuous <Continuous>      Allergies:  No Known Allergies      ROS:  Denies the following except as indicated.    General: weight loss/weight gain, decreased appetite, fatigue  Eyes: Blurry vision, double vision, visual changes  ENT: Throat pain, changes in voice,   CV: palpitations, SOB, CP, cough  GI: NVD, difficulty swallowing, abdominal pain  : polyuria, dysuria  Endo: abnormal menses, temperature intolerance, decreased libido  MSK: weakness, joint pain  Skin: rash, dryness, diaphoresis  Heme: Easy bruising,bleeding  Neuro: HA, dizziness, lightheadedness, numbness tingling  Psych: Anxiety, Depression    Vital Signs Last 24 Hrs  T(C): 36.3 (05 Apr 2018 14:03), Max: 37 (04 Apr 2018 21:50)  T(F): 97.3 (05 Apr 2018 14:03), Max: 98.6 (04 Apr 2018 21:50)  HR: 78 (05 Apr 2018 13:24) (70 - 92)  BP: 171/75 (05 Apr 2018 13:24) (119/64 - 187/77)  BP(mean): --  RR: 16 (05 Apr 2018 13:24) (16 - 18)  SpO2: 96% (05 Apr 2018 13:24) (96% - 98%)  Height (cm): 157.48 (04-04 @ 19:40)  Weight (kg): 99.7 (04-04 @ 19:40)  BMI (kg/m2): 40.2 (04-04 @ 19:40)      Constitutional: wn/wd in NAD.   HEENT: NCAT, MMM, OP clear, EOMI, , no proptosis or lid retraction  Neck: no thyromegaly or palpable thyroid nodules   Respiratory: lungs CTAB.  Cardiovascular: regular rhythm, normal S1 and S2, no audible murmurs, no peripheral edema  GI: soft, NT/ND, no masses/HSM appreciated.  Neurology: no tremors, DTR 2+  Skin: no visible rashes/lesions  Psychiatric: AAO x 3, normal affect/mood.  Ext: radial pulses intact, DP pulses intact, extremities warm, no cyanosis, clubbing or edema.       LABS:                        10.8   7.3   )-----------( 308      ( 05 Apr 2018 10:53 )             33.4     04-05    130<L>  |  94<L>  |  26<H>  ----------------------------<  327<H>  4.6   |  23  |  1.21    Ca    9.7      05 Apr 2018 14:16  Phos  4.2     04-04  Mg     2.3     04-05    TPro  8.2  /  Alb  4.8  /  TBili  1.3<H>  /  DBili  x   /  AST  60<H>  /  ALT  45  /  AlkPhos  109  04-04    PT/INR - ( 04 Apr 2018 17:31 )   PT: 11.2 sec;   INR: 1.01          PTT - ( 04 Apr 2018 17:31 )  PTT:31.0 sec    Hemoglobin A1C, Whole Blood: 8.6 (04-05 @ 07:03)  Hemoglobin A1C, Whole Blood: 8.6 (04-04 @ 17:31)        RADIOLOGY & ADDITIONAL STUDIES:    CAPILLARY BLOOD GLUCOSE      A/P: 61 yo F w/ PMH of DM 1, CAD admitted s/p PCI and JR.     1.  DM 1- Patient prob has CRUZITO but is also very insulin resistant. She has been hyperglycmic even though she is on her pump. She became hyperglycemic after her cath and has been persistently high. Some possibility can be pump failure vs bad insulin sample vs patient under bolusing fo her meals. Rec to give an additional lispro 8 units from the hospital. IF fsg trending, patient is safe for discharge. She should replace all parts of her insulin pump when she is home. She should follow-up in our office.    Pt's fingerstick glucose goal is 100-180    Pt can follow up at discharge with [facility] by calling  to make an appointment.   Will discuss case with Dr. Infante   and update primary team

## 2018-04-05 NOTE — CONSULT NOTE ADULT - ATTENDING COMMENTS
Agree with SOAP & though she is on on sulin pump, most likely she has Type2 DM, with Insulin resistnace,obesity & HASHD with significant CAD & had PCI with placement of DEStent,she is scheduled for another PCI in near future.Discussed the case in detail with primary team,patient & primary team

## 2018-04-05 NOTE — DISCHARGE NOTE ADULT - ABILITY TO HEAR (WITH HEARING AID OR HEARING APPLIANCE IF NORMALLY USED):
decreased hearing both ears right ear most hearing loss  no hearing aids/Mildly to Moderately Impaired: difficulty hearing in some environments or speaker may need to increase volume or speak distinctly

## 2018-04-05 NOTE — DISCHARGE NOTE ADULT - PATIENT PORTAL LINK FT
You can access the [facility] Patient Portal, offered by [facility], by registering with the following website: [email or web address]

## 2018-04-06 LAB — C PEPTIDE SERPL-MCNC: 4.5 NG/ML — SIGNIFICANT CHANGE UP (ref 0.9–7.1)

## 2018-04-09 DIAGNOSIS — I25.10 ATHEROSCLEROTIC HEART DISEASE OF NATIVE CORONARY ARTERY WITHOUT ANGINA PECTORIS: ICD-10-CM

## 2018-04-09 DIAGNOSIS — H40.9 UNSPECIFIED GLAUCOMA: ICD-10-CM

## 2018-04-09 DIAGNOSIS — Z96.41 PRESENCE OF INSULIN PUMP (EXTERNAL) (INTERNAL): ICD-10-CM

## 2018-04-09 DIAGNOSIS — Z79.82 LONG TERM (CURRENT) USE OF ASPIRIN: ICD-10-CM

## 2018-04-09 DIAGNOSIS — Z82.49 FAMILY HISTORY OF ISCHEMIC HEART DISEASE AND OTHER DISEASES OF THE CIRCULATORY SYSTEM: ICD-10-CM

## 2018-04-09 DIAGNOSIS — E11.65 TYPE 2 DIABETES MELLITUS WITH HYPERGLYCEMIA: ICD-10-CM

## 2018-04-09 DIAGNOSIS — Z83.3 FAMILY HISTORY OF DIABETES MELLITUS: ICD-10-CM

## 2018-04-09 DIAGNOSIS — E66.9 OBESITY, UNSPECIFIED: ICD-10-CM

## 2018-04-09 DIAGNOSIS — E78.5 HYPERLIPIDEMIA, UNSPECIFIED: ICD-10-CM

## 2018-04-09 DIAGNOSIS — Z79.02 LONG TERM (CURRENT) USE OF ANTITHROMBOTICS/ANTIPLATELETS: ICD-10-CM

## 2018-04-09 DIAGNOSIS — Z79.4 LONG TERM (CURRENT) USE OF INSULIN: ICD-10-CM

## 2018-04-09 DIAGNOSIS — E10.319 TYPE 1 DIABETES MELLITUS WITH UNSPECIFIED DIABETIC RETINOPATHY WITHOUT MACULAR EDEMA: ICD-10-CM

## 2018-04-18 ENCOUNTER — INPATIENT (INPATIENT)
Facility: HOSPITAL | Age: 63
LOS: 0 days | Discharge: HOME | DRG: 247 | End: 2018-04-19
Attending: INTERNAL MEDICINE | Admitting: INTERNAL MEDICINE
Payer: COMMERCIAL

## 2018-04-18 VITALS — SYSTOLIC BLOOD PRESSURE: 123 MMHG | RESPIRATION RATE: 16 BRPM | HEART RATE: 85 BPM | DIASTOLIC BLOOD PRESSURE: 56 MMHG

## 2018-04-18 DIAGNOSIS — Z90.49 ACQUIRED ABSENCE OF OTHER SPECIFIED PARTS OF DIGESTIVE TRACT: Chronic | ICD-10-CM

## 2018-04-18 DIAGNOSIS — Z98.890 OTHER SPECIFIED POSTPROCEDURAL STATES: Chronic | ICD-10-CM

## 2018-04-18 DIAGNOSIS — Z98.891 HISTORY OF UTERINE SCAR FROM PREVIOUS SURGERY: Chronic | ICD-10-CM

## 2018-04-18 LAB
GLUCOSE BLDC GLUCOMTR-MCNC: 305 MG/DL — HIGH (ref 70–99)
GLUCOSE BLDC GLUCOMTR-MCNC: 328 MG/DL — HIGH (ref 70–99)
GLUCOSE BLDC GLUCOMTR-MCNC: 359 MG/DL — HIGH (ref 70–99)

## 2018-04-18 PROCEDURE — 92928 PRQ TCAT PLMT NTRAC ST 1 LES: CPT | Mod: LC

## 2018-04-18 PROCEDURE — 93454 CORONARY ARTERY ANGIO S&I: CPT | Mod: 26,XU

## 2018-04-18 PROCEDURE — 92978 ENDOLUMINL IVUS OCT C 1ST: CPT | Mod: 26,LC

## 2018-04-18 PROCEDURE — 93010 ELECTROCARDIOGRAM REPORT: CPT

## 2018-04-18 RX ORDER — LATANOPROST 0.05 MG/ML
1 SOLUTION/ DROPS OPHTHALMIC; TOPICAL AT BEDTIME
Refills: 0 | Status: DISCONTINUED | OUTPATIENT
Start: 2018-04-18 | End: 2018-04-19

## 2018-04-18 RX ORDER — TIMOLOL 0.5 %
1 DROPS OPHTHALMIC (EYE) DAILY
Refills: 0 | Status: DISCONTINUED | OUTPATIENT
Start: 2018-04-18 | End: 2018-04-19

## 2018-04-18 RX ORDER — SODIUM CHLORIDE 9 MG/ML
500 INJECTION INTRAMUSCULAR; INTRAVENOUS; SUBCUTANEOUS
Refills: 0 | Status: DISCONTINUED | OUTPATIENT
Start: 2018-04-18 | End: 2018-04-19

## 2018-04-18 RX ORDER — FAMOTIDINE 10 MG/ML
20 INJECTION INTRAVENOUS
Refills: 0 | Status: DISCONTINUED | OUTPATIENT
Start: 2018-04-18 | End: 2018-04-19

## 2018-04-18 RX ORDER — DEXTROSE 50 % IN WATER 50 %
12.5 SYRINGE (ML) INTRAVENOUS ONCE
Refills: 0 | Status: DISCONTINUED | OUTPATIENT
Start: 2018-04-18 | End: 2018-04-19

## 2018-04-18 RX ORDER — CHOLECALCIFEROL (VITAMIN D3) 125 MCG
2000 CAPSULE ORAL DAILY
Refills: 0 | Status: DISCONTINUED | OUTPATIENT
Start: 2018-04-18 | End: 2018-04-19

## 2018-04-18 RX ORDER — NITROGLYCERIN 6.5 MG
0.4 CAPSULE, EXTENDED RELEASE ORAL
Refills: 0 | Status: COMPLETED | OUTPATIENT
Start: 2018-04-18 | End: 2018-04-18

## 2018-04-18 RX ORDER — CLOPIDOGREL BISULFATE 75 MG/1
75 TABLET, FILM COATED ORAL DAILY
Refills: 0 | Status: DISCONTINUED | OUTPATIENT
Start: 2018-04-18 | End: 2018-04-18

## 2018-04-18 RX ORDER — GEMFIBROZIL 600 MG
600 TABLET ORAL
Refills: 0 | Status: DISCONTINUED | OUTPATIENT
Start: 2018-04-18 | End: 2018-04-19

## 2018-04-18 RX ORDER — DEXTROSE 50 % IN WATER 50 %
25 SYRINGE (ML) INTRAVENOUS ONCE
Refills: 0 | Status: DISCONTINUED | OUTPATIENT
Start: 2018-04-18 | End: 2018-04-19

## 2018-04-18 RX ORDER — DEXTROSE 50 % IN WATER 50 %
1 SYRINGE (ML) INTRAVENOUS ONCE
Refills: 0 | Status: DISCONTINUED | OUTPATIENT
Start: 2018-04-18 | End: 2018-04-19

## 2018-04-18 RX ORDER — GLUCAGON INJECTION, SOLUTION 0.5 MG/.1ML
1 INJECTION, SOLUTION SUBCUTANEOUS ONCE
Refills: 0 | Status: DISCONTINUED | OUTPATIENT
Start: 2018-04-18 | End: 2018-04-19

## 2018-04-18 RX ORDER — ATORVASTATIN CALCIUM 80 MG/1
80 TABLET, FILM COATED ORAL AT BEDTIME
Refills: 0 | Status: DISCONTINUED | OUTPATIENT
Start: 2018-04-18 | End: 2018-04-19

## 2018-04-18 RX ORDER — SODIUM CHLORIDE 9 MG/ML
500 INJECTION INTRAMUSCULAR; INTRAVENOUS; SUBCUTANEOUS
Refills: 0 | Status: DISCONTINUED | OUTPATIENT
Start: 2018-04-18 | End: 2018-04-18

## 2018-04-18 RX ORDER — ASPIRIN/CALCIUM CARB/MAGNESIUM 324 MG
81 TABLET ORAL DAILY
Refills: 0 | Status: DISCONTINUED | OUTPATIENT
Start: 2018-04-18 | End: 2018-04-19

## 2018-04-18 RX ORDER — TICAGRELOR 90 MG/1
90 TABLET ORAL
Refills: 0 | Status: DISCONTINUED | OUTPATIENT
Start: 2018-04-19 | End: 2018-04-19

## 2018-04-18 RX ORDER — CHLORHEXIDINE GLUCONATE 213 G/1000ML
1 SOLUTION TOPICAL ONCE
Refills: 0 | Status: DISCONTINUED | OUTPATIENT
Start: 2018-04-18 | End: 2018-04-19

## 2018-04-18 RX ORDER — RANOLAZINE 500 MG/1
500 TABLET, FILM COATED, EXTENDED RELEASE ORAL
Refills: 0 | Status: DISCONTINUED | OUTPATIENT
Start: 2018-04-18 | End: 2018-04-19

## 2018-04-18 RX ORDER — SODIUM CHLORIDE 9 MG/ML
1000 INJECTION, SOLUTION INTRAVENOUS
Refills: 0 | Status: DISCONTINUED | OUTPATIENT
Start: 2018-04-18 | End: 2018-04-19

## 2018-04-18 RX ORDER — METOPROLOL TARTRATE 50 MG
25 TABLET ORAL DAILY
Refills: 0 | Status: DISCONTINUED | OUTPATIENT
Start: 2018-04-18 | End: 2018-04-19

## 2018-04-18 RX ORDER — BRIMONIDINE TARTRATE 2 MG/MG
1 SOLUTION/ DROPS OPHTHALMIC DAILY
Refills: 0 | Status: DISCONTINUED | OUTPATIENT
Start: 2018-04-18 | End: 2018-04-19

## 2018-04-18 RX ORDER — INSULIN LISPRO 100/ML
1 VIAL (ML) SUBCUTANEOUS
Refills: 0 | Status: DISCONTINUED | OUTPATIENT
Start: 2018-04-18 | End: 2018-04-19

## 2018-04-18 RX ADMIN — Medication 30 MILLILITER(S): at 23:44

## 2018-04-18 RX ADMIN — RANOLAZINE 500 MILLIGRAM(S): 500 TABLET, FILM COATED, EXTENDED RELEASE ORAL at 23:09

## 2018-04-18 RX ADMIN — Medication 0.4 MILLIGRAM(S): at 22:24

## 2018-04-18 RX ADMIN — Medication 600 MILLIGRAM(S): at 23:09

## 2018-04-18 RX ADMIN — ATORVASTATIN CALCIUM 80 MILLIGRAM(S): 80 TABLET, FILM COATED ORAL at 23:09

## 2018-04-18 RX ADMIN — Medication 0.4 MILLIGRAM(S): at 22:34

## 2018-04-18 RX ADMIN — LATANOPROST 1 DROP(S): 0.05 SOLUTION/ DROPS OPHTHALMIC; TOPICAL at 23:09

## 2018-04-18 RX ADMIN — Medication 0.4 MILLIGRAM(S): at 22:29

## 2018-04-18 NOTE — H&P ADULT - HISTORY OF PRESENT ILLNESS
SKELETON    61 y/o morbidly obese female with PMHx of HTN, Hyperipidemia, DM1 vs DM2 with insulin pump at home, DM peripheral neuropathy, diastolic dysfunction found on Echo 4/3/18 at [place], CAD s/p cath at [place] and tx to [facility] on 4/4/18 with JR OM1 (80-90%). Residual 70 % pLAD positive by FFR 0.76. Pt now presents back to [facility] on 4/18/18 early AM endorsing chest pain, nausea, vomiting, diarrhea, itchiness, tremors, chills, and pruritis which started 15-20 min after taking Miralax for constipation. Pt’s Trop I uptrended 0.23. Pt found to have SELVIN with Cr 1.55 on 4/18/18, Cr between 1-1.2 on admission in 4/3-5/18. Pt treated with Benadryl 50mg IV x 1, Dexamethasone 10mg IV stat, Zofran and Famotidine IV at [place]. Pt transferred to [facility] for staged PCI of LAD lesion in light of pt’s risk factors, CCS IV symptoms, and elevated troponin.     Pt taking Aspirin 81mg and Plavix 75mg at home. Unclear if pt received them. Also EKG results not included. 63 y/o morbidly obese female with PMHx of HTN, Hyperipidemia, DM1 vs DM2 with insulin pump at home, DM peripheral neuropathy, diastolic dysfunction seen on Echo 4/3/18 @ [place], CAD s/p diagnostic cath at [place] and tx to [facility] on 4/4/18 for staged intervention of JR x 2 OM1 (80-90%). Residual 70 % pLAD positive by FFR 0.76. Pt now presents back to [facility] on 4/18/18 early AM endorsing chest pain, nausea, vomiting, diarrhea, itchiness, tremors, chills, and pruritis at rest which started 15-20 min after taking Miralax for constipation. Denies SOB, palpitations. Pt’s Trop I uptrended 0.23. Pt found to have SELVIN with Cr 1.55 on 4/18/18, Cr between 1-1.2 on admission in 4/3-5/18. Pt's triglycerides elevated which gave concern for pancreatitis. Lasix and Quinipril held and pt given IVF 100cc/hr. Pt treated with Benadryl 50mg IV x 1, Dexamethasone 10mg IV stat, Zofran and Famotidine IV, and nitro patch 0.2mg at [place]. Pt given Aspirin 81mg daily and Plavix 75mg on 4/18/18 prior to transfer.  Pt transferred to [facility] for staged PCI of LAD lesion in light of pt’s risk factors, CCS IV symptoms, and elevated troponin. Pt endorsing chest pressure on arrival to [facility] cath lab. EKG NSR with TWI in I, V5, V6. Pt signed form that she can self administer her insulin pump. FS 350s on arrival to [facility] cath lab. Pt precathed/consented for cardiac cath with Dr. Ayala for staged PCI of LAD. 63 y/o morbidly obese female with PMHx of HTN, Hyperipidemia, DM1 vs DM2 with insulin pump at home, DM peripheral neuropathy, diastolic dysfunction seen on Echo 4/3/18 @ [place], CAD s/p diagnostic cath at [place] and tx to [facility] on 4/4/18 for staged intervention of JR x 2 OM1 (80-90%). Residual 70 % pLAD positive by FFR 0.76. Pt now presents back to [facility] on 4/18/18 early AM endorsing chest pain, nausea, vomiting, diarrhea, itchiness, tremors, chills, and pruritis at rest which started 15-20 min after taking Miralax for constipation. Denies SOB, palpitations. Pt’s Trop I uptrended 0.23. Pt found to have SELVIN with Cr 1.55 on 4/18/18, Cr between 1-1.2 on admission in 4/3-5/18. Pt's triglycerides elevated which gave concern for pancreatitis. Lasix and Quinipril held and pt given IVF 100cc/hr. Pt treated with Benadryl 50mg IV x 1, Dexamethasone 10mg IV stat, Zofran and Famotidine IV, and nitro patch 0.2mg at [place]. Pt given Aspirin 81mg daily and Plavix 75mg on 4/18/18 prior to transfer.  Pt transferred to [facility] for staged PCI of LAD lesion in light of pt’s risk factors, CCS IV symptoms, and elevated troponin. Pt endorsing chest pressure on arrival to [facility] cath lab. EKG NSR with TWI in I, V5, V6. Pt signed form that she can self administer her insulin pump. FS 350s on arrival to [facility] cath lab. Of note pt has excoriations on sacrum, no open wounds or errythema. Pt precathed/consented for cardiac cath with Dr. Ayala for staged PCI of LAD.

## 2018-04-18 NOTE — H&P ADULT - ASSESSMENT
63 y/o morbidly obese female with PMHx of HTN, Hyperipidemia, DM1 vs DM2 with insulin pump at home, DM peripheral neuropathy, diastolic dysfunction seen on Echo 4/3/18 @ [place], CAD s/p diagnostic cath at [place] and tx to [facility] on 4/4/18 for staged intervention of JR x 2 OM1 (80-90%). Residual 70 % pLAD positive by FFR 0.76. Pt now presents back to [facility] on 4/18/18 early AM endorsing chest pain, nausea, vomiting, diarrhea, itchiness, tremors, chills, and pruritis at rest which started 15-20 min after taking Miralax for constipation. Denies SOB, palpitations. Pt’s Trop I uptrended 0.23. Pt found to have SELVIN with Cr 1.55 on 4/18/18, Cr between 1-1.2 on admission in 4/3-5/18. Pt's triglycerides elevated which gave concern for pancreatitis. Lasix and Quinipril held and pt given IVF 100cc/hr. Pt treated with Benadryl 50mg IV x 1, Dexamethasone 10mg IV stat, Zofran and Famotidine IV, and nitro patch 0.2mg at [place]. Pt given Aspirin 81mg daily and Plavix 75mg on 4/18/18 prior to transfer.  Pt transferred to [facility] for staged PCI of LAD lesion in light of pt’s risk factors, CCS IV symptoms, and elevated troponin. Pt endorsing chest pressure on arrival to [facility] cath lab. EKG NSR with TWI in I, V5, V6. Pt signed form that she can self administer her insulin pump. FS 350s on arrival to [facility] cath lab. Pt precathed/consented for cardiac cath with Dr. Ayala for staged PCI of LAD.

## 2018-04-18 NOTE — H&P ADULT - NEGATIVE CARDIOVASCULAR SYMPTOMS
no palpitations/no dyspnea on exertion/no orthopnea/no paroxysmal nocturnal dyspnea/no peripheral edema/no claudication

## 2018-04-19 VITALS — TEMPERATURE: 98 F

## 2018-04-19 LAB
ALBUMIN SERPL ELPH-MCNC: 3.5 G/DL — SIGNIFICANT CHANGE UP (ref 3.3–5)
ALP SERPL-CCNC: 84 U/L — SIGNIFICANT CHANGE UP (ref 40–120)
ALT FLD-CCNC: 53 U/L — HIGH (ref 10–45)
AMYLASE P1 CFR SERPL: 42 U/L — SIGNIFICANT CHANGE UP (ref 25–125)
ANION GAP SERPL CALC-SCNC: 13 MMOL/L — SIGNIFICANT CHANGE UP (ref 5–17)
AST SERPL-CCNC: 188 U/L — HIGH (ref 10–40)
BASOPHILS NFR BLD AUTO: 0.1 % — SIGNIFICANT CHANGE UP (ref 0–2)
BILIRUB SERPL-MCNC: 1.4 MG/DL — HIGH (ref 0.2–1.2)
BUN SERPL-MCNC: 27 MG/DL — HIGH (ref 7–23)
CALCIUM SERPL-MCNC: 9.3 MG/DL — SIGNIFICANT CHANGE UP (ref 8.4–10.5)
CHLORIDE SERPL-SCNC: 101 MMOL/L — SIGNIFICANT CHANGE UP (ref 96–108)
CO2 SERPL-SCNC: 21 MMOL/L — LOW (ref 22–31)
CREAT SERPL-MCNC: 1.32 MG/DL — HIGH (ref 0.5–1.3)
EOSINOPHIL NFR BLD AUTO: 0.1 % — SIGNIFICANT CHANGE UP (ref 0–6)
GGT SERPL-CCNC: 167 U/L — HIGH (ref 8–40)
GLUCOSE BLDC GLUCOMTR-MCNC: 228 MG/DL — HIGH (ref 70–99)
GLUCOSE BLDC GLUCOMTR-MCNC: 304 MG/DL — HIGH (ref 70–99)
GLUCOSE SERPL-MCNC: 202 MG/DL — HIGH (ref 70–99)
HCT VFR BLD CALC: 28.9 % — LOW (ref 34.5–45)
HCT VFR BLD CALC: 32.2 % — LOW (ref 34.5–45)
HGB BLD-MCNC: 10.5 G/DL — LOW (ref 11.5–15.5)
HGB BLD-MCNC: 9.8 G/DL — LOW (ref 11.5–15.5)
LIDOCAIN IGE QN: 24 U/L — SIGNIFICANT CHANGE UP (ref 7–60)
LYMPHOCYTES # BLD AUTO: 5.6 % — LOW (ref 13–44)
MAGNESIUM SERPL-MCNC: 2.3 MG/DL — SIGNIFICANT CHANGE UP (ref 1.6–2.6)
MCHC RBC-ENTMCNC: 28.5 PG — SIGNIFICANT CHANGE UP (ref 27–34)
MCHC RBC-ENTMCNC: 29.1 PG — SIGNIFICANT CHANGE UP (ref 27–34)
MCHC RBC-ENTMCNC: 32.6 G/DL — SIGNIFICANT CHANGE UP (ref 32–36)
MCHC RBC-ENTMCNC: 33.9 G/DL — SIGNIFICANT CHANGE UP (ref 32–36)
MCV RBC AUTO: 85.8 FL — SIGNIFICANT CHANGE UP (ref 80–100)
MCV RBC AUTO: 87.3 FL — SIGNIFICANT CHANGE UP (ref 80–100)
MONOCYTES NFR BLD AUTO: 3.2 % — SIGNIFICANT CHANGE UP (ref 2–14)
NEUTROPHILS NFR BLD AUTO: 91 % — HIGH (ref 43–77)
PLATELET # BLD AUTO: 221 K/UL — SIGNIFICANT CHANGE UP (ref 150–400)
PLATELET # BLD AUTO: 235 K/UL — SIGNIFICANT CHANGE UP (ref 150–400)
POTASSIUM SERPL-MCNC: 4.5 MMOL/L — SIGNIFICANT CHANGE UP (ref 3.5–5.3)
POTASSIUM SERPL-SCNC: 4.5 MMOL/L — SIGNIFICANT CHANGE UP (ref 3.5–5.3)
PROT SERPL-MCNC: 6.7 G/DL — SIGNIFICANT CHANGE UP (ref 6–8.3)
RBC # BLD: 3.37 M/UL — LOW (ref 3.8–5.2)
RBC # BLD: 3.69 M/UL — LOW (ref 3.8–5.2)
RBC # FLD: 13.5 % — SIGNIFICANT CHANGE UP (ref 10.3–16.9)
RBC # FLD: 13.7 % — SIGNIFICANT CHANGE UP (ref 10.3–16.9)
SODIUM SERPL-SCNC: 135 MMOL/L — SIGNIFICANT CHANGE UP (ref 135–145)
WBC # BLD: 17.1 K/UL — HIGH (ref 3.8–10.5)
WBC # BLD: 19.5 K/UL — HIGH (ref 3.8–10.5)
WBC # FLD AUTO: 17.1 K/UL — HIGH (ref 3.8–10.5)
WBC # FLD AUTO: 19.5 K/UL — HIGH (ref 3.8–10.5)

## 2018-04-19 PROCEDURE — C1874: CPT

## 2018-04-19 PROCEDURE — C1894: CPT

## 2018-04-19 PROCEDURE — 82150 ASSAY OF AMYLASE: CPT

## 2018-04-19 PROCEDURE — C1887: CPT

## 2018-04-19 PROCEDURE — C1725: CPT

## 2018-04-19 PROCEDURE — 83735 ASSAY OF MAGNESIUM: CPT

## 2018-04-19 PROCEDURE — 85025 COMPLETE CBC W/AUTO DIFF WBC: CPT

## 2018-04-19 PROCEDURE — C1753: CPT

## 2018-04-19 PROCEDURE — 82962 GLUCOSE BLOOD TEST: CPT

## 2018-04-19 PROCEDURE — 85027 COMPLETE CBC AUTOMATED: CPT

## 2018-04-19 PROCEDURE — 99238 HOSP IP/OBS DSCHRG MGMT 30/<: CPT

## 2018-04-19 PROCEDURE — C1889: CPT

## 2018-04-19 PROCEDURE — 82977 ASSAY OF GGT: CPT

## 2018-04-19 PROCEDURE — C1769: CPT

## 2018-04-19 PROCEDURE — 80053 COMPREHEN METABOLIC PANEL: CPT

## 2018-04-19 PROCEDURE — 83690 ASSAY OF LIPASE: CPT

## 2018-04-19 PROCEDURE — 93005 ELECTROCARDIOGRAM TRACING: CPT

## 2018-04-19 PROCEDURE — 36415 COLL VENOUS BLD VENIPUNCTURE: CPT

## 2018-04-19 PROCEDURE — C1760: CPT

## 2018-04-19 RX ORDER — RANOLAZINE 500 MG/1
1 TABLET, FILM COATED, EXTENDED RELEASE ORAL
Qty: 60 | Refills: 11
Start: 2018-04-19 | End: 2019-04-13

## 2018-04-19 RX ORDER — TICAGRELOR 90 MG/1
1 TABLET ORAL
Qty: 60 | Refills: 11
Start: 2018-04-19 | End: 2019-04-13

## 2018-04-19 RX ORDER — ASPIRIN/CALCIUM CARB/MAGNESIUM 324 MG
1 TABLET ORAL
Qty: 30 | Refills: 11
Start: 2018-04-19 | End: 2019-04-13

## 2018-04-19 RX ORDER — GEMFIBROZIL 600 MG
1 TABLET ORAL
Qty: 60 | Refills: 11
Start: 2018-04-19 | End: 2019-04-13

## 2018-04-19 RX ADMIN — TICAGRELOR 90 MILLIGRAM(S): 90 TABLET ORAL at 06:11

## 2018-04-19 RX ADMIN — Medication 81 MILLIGRAM(S): at 13:22

## 2018-04-19 RX ADMIN — Medication 25 MILLIGRAM(S): at 06:10

## 2018-04-19 RX ADMIN — RANOLAZINE 500 MILLIGRAM(S): 500 TABLET, FILM COATED, EXTENDED RELEASE ORAL at 13:22

## 2018-04-19 RX ADMIN — Medication 30 MILLILITER(S): at 06:10

## 2018-04-19 NOTE — DISCHARGE NOTE ADULT - CARE PLAN
Principal Discharge DX:	Acute coronary syndrome  Goal:	Please continue to take aspirin 81 mg oral daily, brilinta 90 mg oral twice daily and ranexa 500 mg oral twice daily  Assessment and plan of treatment:	You underwent a cardiac catheterization and received a stent to the obtuse marginal artery between the 2 stents that were placed during your previous visit on 4/4/2018. The procedure was done through your groin. You do not need to keep this area covered and you may shower.  Please avoid any heavy lifting  (no more than 3 to 5 lbs) or strenuous activity for five days.  If you develop any swelling, bleeding, hardening of the skin (hematoma formation), acute pain, numbness/tingling  in your leg please contact your doctor immediately or call our 24/7 line: [phone]    NEVER MISS A DOSE OF ASPIRIN OR BRILINTA IF YOU DO, YOU ARE AT RISK OF YOUR STENT CLOSING AND HAVING A HEART ATTACK. DO NOT STOP THESE TWO MEDICATIONS UNLESS INSTRUCTED TO DO SO BY YOUR CARDIOLOGIST    Prescriptions for the aspirin, brilinta and ranexa were sent to your pharmacy.    Please follow up with Dr. Jamie Staley in 1-2 weeks of discharge.  Secondary Diagnosis:	Diabetes  Goal:	HOLD METFORMIN, RESTART ON SATURDAY 4/21/2018  Assessment and plan of treatment:	If you are a diabetic and you take Metformin: DO NOT TAKE your Metformin for two days. This medication can interact with the contrast used during your procedure therefore we want to ensure the contrast has left your body prior to you restarting your Metformin. Make sure you monitor your finger sticks and diet while you are not taking your Metformin!    Please follow up with Dr. Marj Up for management of diabetes and insulin pump within 1-2 weeks.  Secondary Diagnosis:	Hyperlipidemia  Goal:	Please continue rosuvastatin 40 mg oral daily and gemfibrozil 600 mg oral twice daily.  Assessment and plan of treatment:	The prescription for gemfibrozil was sent to your pharmacy.  Secondary Diagnosis:	Hypertension Principal Discharge DX:	Acute coronary syndrome  Goal:	Please continue to take aspirin 81 mg oral daily, brilinta 90 mg oral twice daily and ranexa 500 mg oral twice daily  Assessment and plan of treatment:	You underwent a cardiac catheterization and received a stent to the obtuse marginal artery between the 2 stents that were placed during your previous visit on 4/4/2018. The procedure was done through your groin. You do not need to keep this area covered and you may shower.  Please avoid any heavy lifting  (no more than 3 to 5 lbs) or strenuous activity for five days.  If you develop any swelling, bleeding, hardening of the skin (hematoma formation), acute pain, numbness/tingling  in your leg please contact your doctor immediately or call our 24/7 line: [phone]    NEVER MISS A DOSE OF ASPIRIN OR BRILINTA IF YOU DO, YOU ARE AT RISK OF YOUR STENT CLOSING AND HAVING A HEART ATTACK. DO NOT STOP THESE TWO MEDICATIONS UNLESS INSTRUCTED TO DO SO BY YOUR CARDIOLOGIST    Prescriptions for the aspirin, brilinta and ranexa were sent to your pharmacy.    Please follow up with Dr. Jamie Staley in 1-2 weeks of discharge.  Secondary Diagnosis:	Diabetes  Goal:	HOLD METFORMIN, RESTART ON SATURDAY 4/21/2018  Assessment and plan of treatment:	If you are a diabetic and you take Metformin: DO NOT TAKE your Metformin for two days. This medication can interact with the contrast used during your procedure therefore we want to ensure the contrast has left your body prior to you restarting your Metformin. Make sure you monitor your finger sticks and diet while you are not taking your Metformin!    Please follow up with Dr. Marj Up with the [facility] for management of diabetes and insulin pump within 1-2 weeks.  Secondary Diagnosis:	Hyperlipidemia  Goal:	Please continue rosuvastatin 40 mg oral daily and gemfibrozil (Lopid) 600 mg oral twice daily.  Assessment and plan of treatment:	The prescription for gemfibrozil was sent to your pharmacy.  Secondary Diagnosis:	Hypertension Principal Discharge DX:	Acute coronary syndrome  Goal:	Please continue to take aspirin 81 mg oral daily, brilinta 90 mg oral twice daily and ranexa 500 mg oral twice daily  Assessment and plan of treatment:	You underwent a cardiac catheterization and received a stent to the obtuse marginal artery between the 2 stents that were placed during your previous visit on 4/4/2018. The procedure was done through your groin. You do not need to keep this area covered and you may shower.  Please avoid any heavy lifting  (no more than 3 to 5 lbs) or strenuous activity for five days.  If you develop any swelling, bleeding, hardening of the skin (hematoma formation), acute pain, numbness/tingling  in your leg please contact your doctor immediately or call our 24/7 line: [phone]    NEVER MISS A DOSE OF ASPIRIN OR BRILINTA IF YOU DO, YOU ARE AT RISK OF YOUR STENT CLOSING AND HAVING A HEART ATTACK. DO NOT STOP THESE TWO MEDICATIONS UNLESS INSTRUCTED TO DO SO BY YOUR CARDIOLOGIST    Prescriptions for the aspirin, brilinta and ranexa were sent to your pharmacy.    Please follow up with Dr. Jamie Staley in 1-2 weeks of discharge.  Secondary Diagnosis:	Diabetes  Goal:	HOLD METFORMIN, RESTART ON SATURDAY 4/21/2018  Assessment and plan of treatment:	If you are a diabetic and you take Metformin: DO NOT TAKE your Metformin for two days. This medication can interact with the contrast used during your procedure therefore we want to ensure the contrast has left your body prior to you restarting your Metformin. Make sure you monitor your finger sticks and diet while you are not taking your Metformin!    Please follow up with Dr. Marj Up with the [facility] for management of diabetes and insulin pump within 1-2 weeks.  Secondary Diagnosis:	Hyperlipidemia  Goal:	Please continue rosuvastatin 40 mg oral daily and gemfibrozil (Lopid) 600 mg oral twice daily.  Assessment and plan of treatment:	The prescription for gemfibrozil (Lopid) was sent to your pharmacy.  Secondary Diagnosis:	Hypertension  Goal:	Please continue your medications for HTN, metoprolol succinate 25 mg oral once daily, quinapril 20 mg oral once day, and lasix 20 mg oral once day.

## 2018-04-19 NOTE — DISCHARGE NOTE ADULT - PROVIDER TOKENS
TOKEN:'[id number]',FREE:[LAST:[[facility]],PHONE:[phone]],FAX:[(   )    -]]

## 2018-04-19 NOTE — DISCHARGE NOTE ADULT - PLAN OF CARE
Please continue to take aspirin 81 mg oral daily, brilinta 90 mg oral twice daily and ranexa 500 mg oral twice daily You underwent a cardiac catheterization and received a stent to the obtuse marginal artery between the 2 stents that were placed during your previous visit on 4/4/2018. The procedure was done through your groin. You do not need to keep this area covered and you may shower.  Please avoid any heavy lifting  (no more than 3 to 5 lbs) or strenuous activity for five days.  If you develop any swelling, bleeding, hardening of the skin (hematoma formation), acute pain, numbness/tingling  in your leg please contact your doctor immediately or call our 24/7 line: [phone]    NEVER MISS A DOSE OF ASPIRIN OR BRILINTA IF YOU DO, YOU ARE AT RISK OF YOUR STENT CLOSING AND HAVING A HEART ATTACK. DO NOT STOP THESE TWO MEDICATIONS UNLESS INSTRUCTED TO DO SO BY YOUR CARDIOLOGIST    Prescriptions for the aspirin, brilinta and ranexa were sent to your pharmacy.    Please follow up with Dr. Jamie Staley in 1-2 weeks of discharge. HOLD METFORMIN, RESTART ON SATURDAY 4/21/2018 If you are a diabetic and you take Metformin: DO NOT TAKE your Metformin for two days. This medication can interact with the contrast used during your procedure therefore we want to ensure the contrast has left your body prior to you restarting your Metformin. Make sure you monitor your finger sticks and diet while you are not taking your Metformin!    Please follow up with Dr. Marj Up for management of diabetes and insulin pump within 1-2 weeks. Please continue rosuvastatin 40 mg oral daily and gemfibrozil 600 mg oral twice daily. The prescription for gemfibrozil was sent to your pharmacy. If you are a diabetic and you take Metformin: DO NOT TAKE your Metformin for two days. This medication can interact with the contrast used during your procedure therefore we want to ensure the contrast has left your body prior to you restarting your Metformin. Make sure you monitor your finger sticks and diet while you are not taking your Metformin!    Please follow up with Dr. Marj Up with the [facility] for management of diabetes and insulin pump within 1-2 weeks. Please continue rosuvastatin 40 mg oral daily and gemfibrozil (Lopid) 600 mg oral twice daily. The prescription for gemfibrozil (Lopid) was sent to your pharmacy. Please continue your medications for HTN, metoprolol succinate 25 mg oral once daily, quinapril 20 mg oral once day, and lasix 20 mg oral once day.

## 2018-04-19 NOTE — DISCHARGE NOTE ADULT - CARE PROVIDER_API CALL
Jamie Staley), Cardiology  Vascular  [address] [address]  Phone: [phone]  Fax: [phone]    [facility],   Phone: [phone]  Fax: (   )    -

## 2018-04-19 NOTE — CONSULT NOTE ADULT - SUBJECTIVE AND OBJECTIVE BOX
CHIEF COMPLAINT: CAD    HISTORY OF PRESENT ILLNESS:  63 y/o morbidly obese female with PMHx of HTN, Hyperlipidemia DM1 vs DM2 with insulin pump at home, DM peripheral neuropathy, diastolic dysfunction seen on Echo 4/3/18 @ [facility], CAD s/p diagnostic cath at [facility] and tx to [facility] on 18 for staged intervention of JR x 2 OM1 (80-90%). Residual 70 % pLAD positive by FFR 0.76. Pt now presents back to [facility] on 18 early AM endorsing chest pain, nausea, vomiting, diarrhea, itchiness, tremors, chills, and pruritis at rest which started 15-20 min after taking Miralax for constipation. Denies SOB, palpitations. Pt’s Trop I uptrended 0.23. Pt found to have SELVIN with Cr 1.55 on 18, Cr between 1-1.2 on admission in 4/3-. Pt's triglycerides elevated which gave concern for pancreatitis. Lasix and Quinipril held and pt given IVF 100cc/hr. Pt treated with Benadryl 50mg IV x 1, Dexamethasone 10mg IV stat, Zofran and Famotidine IV, and nitro patch 0.2mg at [facility]. Pt given Aspirin 81mg daily and Plavix 75mg on 18 prior to transfer.  Pt transferred to [facility] for staged PCI of LAD lesion in light of pt’s risk factors, CCS IV symptoms, and elevated troponin. Pt endorsing chest pressure on arrival to [facility] cath lab. EKG NSR with TWI in I, V5, V6. Pt signed form that she can self administer her insulin pump. FS 350s on arrival to [facility] cath lab. Of note pt has excoriations on sacrum, no open wounds or erythema Pt precathed/consented for cardiac cath with Dr. Ayala for staged PCI of LAD. Pt s/p cardiac cath on 18: JR OM1. Pt had 2 stents from 18 cath with a space in-between which thrombosed. Pt loaded with Brilinta 180mg and Aspirin 243 on the table. Right groin AS stable with no hematoma, bleeding, distal +2 pulses. Continue Aspirin/Brilinta. Start Ranexa 500mg BID.  Pt has insulin pump and signed form stating that pt can use her own insulin pump is in her chart. FS in 350s pre cath and pt ate around 12pm.  post cath and pt administered 10.4 units. Pt without events overnight on telemetry. VSS stable. FS in 200 's today, endocrine consulted and will see pt but management of insulin pump will not be done during admission and not holding discharge. At  [facility] there was concern for pancreatitis, amylase, lipase and ggtp checked and normal with no further work up. Lab significant for WBC 19.5 but expected with recent dexamethasone 10 mg IV at [facility] and pt afebrile and asymptomatic. Endocrine consulted for elevated FS pre and post cath for which pt has been using her insulin pump. No adjustments will be made to her insulin regimen at this time and will be done outpatient. Per endocrine, her elevated FS will not prevent her from discharge today. Pt will continue aspirin 81 mg oral daily, brilinta 90 mg oral twice daily, ranexa 500 mg oral twice daily, rosuvastatin 40 mg oral daily, metoprolol succinate ER 25 mg oral daily, and gemfibrozil 600 mg oral twice daily. Metformin to be held and restart on 18. Pt insulin pump was managed by pt's PCP but pt is going to follow up with Dr. Marj Regalado who was consulted during pt's last admission.  Pt will need to call the provided number to make an appointment. Pt deemed stable for discharge per Dr. Cortez and endocrinology and will follow up with Dr. Staley in 1-2 weeks.  She was seen at the bedside to discuss prevention.     PAST MEDICAL & SURGICAL HISTORY:  Glaucoma  Hypophosphatemia  Diabetes  Hyperlipidemia  Hypertension  Acute coronary syndrome  History of cholecystectomy  S/P appendectomy  History of : X 3    FAMILY HISTORY:   Denies.     Allergies:   MiraLax (Hives; Pruritus)      HOME MEDICATIONS:  · 	metoprolol succinate 25 mg oral tablet, extended release: 1 tab(s) orally once a day  · 	Plavix 75 mg oral tablet: 1 tab(s) orally once a day   · 	Aspirin Enteric Coated 81 mg oral delayed release tablet: 1 tab(s) orally once a day   · 	Lopid 600 mg oral tablet: 1 tab(s) orally 2 times a day  · 	Pepcid 20 mg oral tablet: 1 tab(s) orally 2 times a day  · 	Lasix 20 mg oral tablet: 1 tab(s) orally once a day  · 	quinapril 20 mg oral tablet: 1 tab(s) orally once a day  · 	cholecalciferol 2000 intl units oral tablet: 1 tab(s) orally once a day  · 	rosuvastatin 40 mg oral tablet: tab(s) orally once a day (at bedtime)  · 	brimonidine-timolol 0.2%-0.5% ophthalmic solution: to each affected eye once a day  · 	metFORMIN 1000 mg oral tablet: 1 tab(s) orally 2 times a day  · 	bimatoprost 0.01% ophthalmic solution:     PHYSICAL EXAM:  T(C): 35.9 (18 @ 14:13), Max: 37.3 (18 @ 00:02)  T(F): 96.6 (18 @ 14:13), Max: 99.2 (18 @ 00:02)  HR: 82 (18 @ 08:39) (78 - 85)  BP: 114/56 (18 @ 08:39) (110/54 - 123/56)  RR: 18 (18 @ 08:39) (16 - 18)  Height (cm): 157.48 ( @ 06:06)  Weight (kg): 99.5 ( @ 06:06)  BMI (kg/m2): 40.1 ( @ 06:06)  	  LABS:                        10.5   17.1  )-----------( 235      ( 2018 11:16 )             32.2         135  |  101  |  27<H>  ----------------------------<  202<H>  4.5   |  21<L>  |  1.32<H>    Ca    9.3      2018 06:48  Mg     2.3         TPro  6.7  /  Alb  3.5  /  TBili  1.4<H>  /  DBili  x   /  AST  188<H>  /  ALT  53<H>  /  AlkPhos  84      Hemoglobin A1C, Whole Blood (18 @ 07:03)    Hemoglobin A1C, Whole Blood: 8.6    Lipid Profile (18 @ 17:31)    Total Cholesterol/HDL Ratio Measurement: 6.4 RATIO    Cholesterol, Serum: 223 mg/dL    Triglycerides, Serum: 625 mg/dL    HDL Cholesterol, Serum: 35 mg/dL    Direct LDL: 63    10 "S" ASSESSMENT PLAN: SMOKING, SITTING, SUGAR, SALT, SOME FATS, SOCIAL, SLEEP, SIGNS, AND MEDS:  Tobacco usage: Denies.   ETOH: Denies.   Stress: Rates stress as high related to health conditions. She relaxes by watching TV and has a supportive family.   Caffeine: She has not been drinking caffeine over the last few months but does drink decaf coffee with Splenda and cream.   Hormone Replacement: Denies.   Sleep Disorder: She does snore and often wakes feeling tired. She has not yet had a sleep apnea test.   Inflammatory Condition: Denies.   Activity Level: Sedentary.   Heart Failure: + diastolic dysfunction. She denies symptoms of congestion at this time.   Myopathy with Statins: Denies.   GI/ Issues: She takes Prilosec for acid reflux and reports a normal EGD last year.   GYN: She denies history of pre-term deliveries or hypertension while pregnant or gestational diabetes.   Migraines: Denies.   Current Diet: (she reports changing her diet over the last few weeks) Breakfast) muffin with yogurt, Cheerios with skim milk, a Dannon light and fit yogurt, and a banana. Lunch) turkey sandwich on whole wheat bread or salad with oil and vinegar. Dinner) chicken and salad or another turkey sandwich with veggie soup.     ASSESSMENT/RECOMMENDATIONS: 	  Summary: 63 y/o morbidly obese female with PMHx of HTN, Hyperlipidemia DM1 vs DM2 with insulin pump at home, DM peripheral neuropathy, diastolic dysfunction seen on Echo 4/3/18 @ [facility], CAD s/p diagnostic cath at [facility] and tx to [facility] on 18 for staged intervention of JR x 2 OM1 (80-90%). Residual 70 % pLAD positive by FFR 0.76. Pt now presents back to [facility] on 18 early AM endorsing chest pain, nausea, vomiting, diarrhea, itchiness, tremors, chills, and pruritis at rest which started 15-20 min after taking Miralax for constipation. Denies SOB, palpitations. Pt’s Trop I uptrended 0.23. Pt found to have SELVIN with Cr 1.55 on 18, Cr between 1-1.2 on admission in 4/3-. Pt's triglycerides elevated which gave concern for pancreatitis. Lasix and Quinipril held and pt given IVF 100cc/hr. Pt treated with Benadryl 50mg IV x 1, Dexamethasone 10mg IV stat, Zofran and Famotidine IV, and nitro patch 0.2mg at [facility]. Pt given Aspirin 81mg daily and Plavix 75mg on 18 prior to transfer.  Pt transferred to [facility] for staged PCI of LAD lesion in light of pt’s risk factors, CCS IV symptoms, and elevated troponin. Pt endorsing chest pressure on arrival to [facility] cath lab. EKG NSR with TWI in I, V5, V6. Pt signed form that she can self administer her insulin pump. FS 350s on arrival to [facility] cath lab. Of note pt has excoriations on sacrum, no open wounds or erythema Pt precathed/consented for cardiac cath with Dr. Ayala for staged PCI of LAD. Pt s/p cardiac cath on 18: JR OM1. Pt had 2 stents from 18 cath with a space in-between which thrombosed. Pt loaded with Brilinta 180mg and Aspirin 243 on the table. Right groin AS stable with no hematoma, bleeding, distal +2 pulses. Continue Aspirin/Brilinta. Start Ranexa 500mg BID.  Pt has insulin pump and signed form stating that pt can use her own insulin pump is in her chart. FS in 350s pre cath and pt ate around 12pm.  post cath and pt administered 10.4 units. Pt without events overnight on telemetry. VSS stable. FS in 200 's today, endocrine consulted and will see pt but management of insulin pump will not be done during admission and not holding discharge. At  [facility] there was concern for pancreatitis, amylase, lipase and ggtp checked and normal with no further work up. Lab significant for WBC 19.5 but expected with recent dexamethasone 10 mg IV at [facility] and pt afebrile and asymptomatic. Endocrine consulted for elevated FS pre and post cath for which pt has been using her insulin pump. No adjustments will be made to her insulin regimen at this time and will be done outpatient. Per endocrine, her elevated FS will not prevent her from discharge today. Pt will continue aspirin 81 mg oral daily, brilinta 90 mg oral twice daily, ranexa 500 mg oral twice daily, rosuvastatin 40 mg oral daily, metoprolol succinate ER 25 mg oral daily, and gemfibrozil 600 mg oral twice daily. Metformin to be held and restart on 18. Pt insulin pump was managed by pt's PCP but pt is going to follow up with Dr. Marj Regalado who was consulted during pt's last admission.  Pt will need to call the provided number to make an appointment. Pt deemed stable for discharge per Dr. Cortez and endocrinology and will follow up with Dr. Staley in 1-2 weeks.  She was seen at the bedside to discuss prevention.       RECOMMENDATIONS:   Anti-platelet Therapy: DAPT per interventionalist recommendation with asa/Brilinta.   Lipid Therapy: High dose statin therapy would likely benefit this patient. She is currently tolerating Crestor 40 mg/d with an LDL of 63. Her Triglycerides remain very high, 625, and she has been prescribed Gemfibrozil. She will follow with endocrine to further manage her hyperglycemia and diabetes.    Beta Blocker Therapy: Continue current therapy with metoprolol per your discretion.   ACE/ARB Therapy: Continue current therapy with quinapril per your discretion.   Diet: Low-sodium, low-carbohydrate, Mediterranean diet. Written instruction on the Mediterranean diet was provided. She is encouraged to follow up with endocrine for further management as well as [facility] to further work on her diet.   Exercise: 30-45 minutes most days of the week once cleared to do so by their referring cardiologist. Cardiac rehab might benefit this patient.   Smoking: This patient is a non-smoker.   Stress Management: Instruction on mindfulness meditation was provided.   Sleep: A sleep study might benefit this patient.     Thank you for the opportunity to see this patient. Please feel free to contact [facility] if there are any questions, or if you feel that your patient would benefit from continued follow-up visits with the Program.

## 2018-04-19 NOTE — DISCHARGE NOTE ADULT - HOSPITAL COURSE
61 y/o morbidly obese female with PMHx of HTN, Hyperipidemia, DM1 vs DM2 with insulin pump at home, DM peripheral neuropathy, diastolic dysfunction seen on Echo 4/3/18 @ [place], CAD s/p diagnostic cath at [place] and tx to [facility] on 4/4/18 for staged intervention of JR x 2 OM1 (80-90%). Residual 70 % pLAD positive by FFR 0.76. Pt now presents back to [facility] on 4/18/18 early AM endorsing chest pain, nausea, vomiting, diarrhea, itchiness, tremors, chills, and pruritis at rest which started 15-20 min after taking Miralax for constipation. Denies SOB, palpitations. Pt’s Trop I uptrended 0.23. Pt found to have SELVIN with Cr 1.55 on 4/18/18, Cr between 1-1.2 on admission in 4/3-5/18. Pt's triglycerides elevated which gave concern for pancreatitis. Lasix and Quinipril held and pt given IVF 100cc/hr. Pt treated with Benadryl 50mg IV x 1, Dexamethasone 10mg IV stat, Zofran and Famotidine IV, and nitro patch 0.2mg at [place]. Pt given Aspirin 81mg daily and Plavix 75mg on 4/18/18 prior to transfer.  Pt transferred to [facility] for staged PCI of LAD lesion in light of pt’s risk factors, CCS IV symptoms, and elevated troponin. Pt endorsing chest pressure on arrival to [facility] cath lab. EKG NSR with TWI in I, V5, V6. Pt signed form that she can self administer her insulin pump. FS 350s on arrival to [facility] cath lab. Of note pt has excoriations on sacrum, no open wounds or errythema. Pt precathed/consented for cardiac cath with Dr. Ayala for staged PCI of LAD. Pt s/p cardiac cath on 4/18/18: JR OM1. Pt had 2 stents from 4/4/18 cath with a space inbetween which thrombosed. Pt loaded with Brilinta 180mg and Aspirin 243 on the table. Right groin AS. Continue Aspirin/Brilinta. Start Ranexa 500mg BID.  Pt has insulin pump and signed form stating that pt can use her own insulin pump is in her chart. FS in 350s pre cath and pt ate around 12pm.  post cath and pt adminsitered 10.4 units. Pt without events overnight on telemetry. VSS stable. FS in 200 's today, endocrine consulted and will see pt but management of insulin pump will not be done during admission and not holding discharge. At [facility] there was concern for pancreatitis, amylase, lipase and ggtp checked and normal with no further work up. Lab significant for WBC 19.5 but expected with recent dexamethasone 10 mg IV at [facility] and pt afebrile and asymptomatic. Pt will continue aspirin 81 mg oral daily, brilinta 90 mg oral twice daily, ranexa 500 mg oral twice daily, rosuvastatin 40 mg oral daily, metoprolol succinate ER 25 mg oral daily, and gemfibrozil 600 mg oral twice daily. Metformin to be held and restart on Saturday 4/21/18. Pt insulin pump managed by 61 y/o morbidly obese female with PMHx of HTN, Hyperipidemia, DM1 vs DM2 with insulin pump at home, DM peripheral neuropathy, diastolic dysfunction seen on Echo 4/3/18 @ [place], CAD s/p diagnostic cath at [place] and tx to [facility] on 4/4/18 for staged intervention of JR x 2 OM1 (80-90%). Residual 70 % pLAD positive by FFR 0.76. Pt now presents back to [facility] on 4/18/18 early AM endorsing chest pain, nausea, vomiting, diarrhea, itchiness, tremors, chills, and pruritis at rest which started 15-20 min after taking Miralax for constipation. Denies SOB, palpitations. Pt’s Trop I uptrended 0.23. Pt found to have SELVIN with Cr 1.55 on 4/18/18, Cr between 1-1.2 on admission in 4/3-5/18. Pt's triglycerides elevated which gave concern for pancreatitis. Lasix and Quinipril held and pt given IVF 100cc/hr. Pt treated with Benadryl 50mg IV x 1, Dexamethasone 10mg IV stat, Zofran and Famotidine IV, and nitro patch 0.2mg at [place]. Pt given Aspirin 81mg daily and Plavix 75mg on 4/18/18 prior to transfer.  Pt transferred to [facility] for staged PCI of LAD lesion in light of pt’s risk factors, CCS IV symptoms, and elevated troponin. Pt endorsing chest pressure on arrival to [facility] cath lab. EKG NSR with TWI in I, V5, V6. Pt signed form that she can self administer her insulin pump. FS 350s on arrival to [facility] cath lab. Of note pt has excoriations on sacrum, no open wounds or errythema. Pt precathed/consented for cardiac cath with Dr. Ayala for staged PCI of LAD. Pt s/p cardiac cath on 4/18/18: JR OM1. Pt had 2 stents from 4/4/18 cath with a space inbetween which thrombosed. Pt loaded with Brilinta 180mg and Aspirin 243 on the table. Right groin AS. Continue Aspirin/Brilinta. Start Ranexa 500mg BID.  Pt has insulin pump and signed form stating that pt can use her own insulin pump is in her chart. FS in 350s pre cath and pt ate around 12pm.  post cath and pt adminsitered 10.4 units. Pt without events overnight on telemetry. VSS stable. FS in 200 's today, endocrine consulted and will see pt but management of insulin pump will not be done during admission and not holding discharge. At [facility] there was concern for pancreatitis, amylase, lipase and ggtp checked and normal with no further work up. Lab significant for WBC 19.5 but expected with recent dexamethasone 10 mg IV at [facility] and pt afebrile and asymptomatic. Pt will continue aspirin 81 mg oral daily, brilinta 90 mg oral twice daily, ranexa 500 mg oral twice daily, rosuvastatin 40 mg oral daily, metoprolol succinate ER 25 mg oral daily, and gemfibrozil 600 mg oral twice daily. Metformin to be held and restart on Saturday 4/21/18. Pt insulin pump managed by pt's PCP but pt is going to follow up with Dr. Marj Regalado who was consulted during pt's last admission. 61 y/o morbidly obese female with PMHx of HTN, Hyperipidemia, DM1 vs DM2 with insulin pump at home, DM peripheral neuropathy, diastolic dysfunction seen on Echo 4/3/18 @ [place], CAD s/p diagnostic cath at [place] and tx to [facility] on 4/4/18 for staged intervention of JR x 2 OM1 (80-90%). Residual 70 % pLAD positive by FFR 0.76. Pt now presents back to [facility] on 4/18/18 early AM endorsing chest pain, nausea, vomiting, diarrhea, itchiness, tremors, chills, and pruritis at rest which started 15-20 min after taking Miralax for constipation. Denies SOB, palpitations. Pt’s Trop I uptrended 0.23. Pt found to have SELVIN with Cr 1.55 on 4/18/18, Cr between 1-1.2 on admission in 4/3-5/18. Pt's triglycerides elevated which gave concern for pancreatitis. Lasix and Quinipril held and pt given IVF 100cc/hr. Pt treated with Benadryl 50mg IV x 1, Dexamethasone 10mg IV stat, Zofran and Famotidine IV, and nitro patch 0.2mg at [place]. Pt given Aspirin 81mg daily and Plavix 75mg on 4/18/18 prior to transfer.  Pt transferred to [facility] for staged PCI of LAD lesion in light of pt’s risk factors, CCS IV symptoms, and elevated troponin. Pt endorsing chest pressure on arrival to [facility] cath lab. EKG NSR with TWI in I, V5, V6. Pt signed form that she can self administer her insulin pump. FS 350s on arrival to [facility] cath lab. Of note pt has excoriations on sacrum, no open wounds or errythema. Pt precathed/consented for cardiac cath with Dr. Ayala for staged PCI of LAD. Pt s/p cardiac cath on 4/18/18: JR OM1. Pt had 2 stents from 4/4/18 cath with a space inbetween which thrombosed. Pt loaded with Brilinta 180mg and Aspirin 243 on the table. Right groin AS stable with no hematoma, bleeding, distal +2 pulses. Continue Aspirin/Brilinta. Start Ranexa 500mg BID.  Pt has insulin pump and signed form stating that pt can use her own insulin pump is in her chart. FS in 350s pre cath and pt ate around 12pm.  post cath and pt administered 10.4 units. Pt without events overnight on telemetry. VSS stable. FS in 200 's today, endocrine consulted and will see pt but management of insulin pump will not be done during admission and not holding discharge. At [facility] there was concern for pancreatitis, amylase, lipase and ggtp checked and normal with no further work up. Lab significant for WBC 19.5 but expected with recent dexamethasone 10 mg IV at [facility] and pt afebrile and asymptomatic. Endocrine consulted for elevated FS pre and post cath for which pt has been using her insulin pump. No adjustments will be made to her insulin regimen at this time and will be done outpatient. Per endocrine, her elevated FS will not prevent her from discharge today. Pt will continue aspirin 81 mg oral daily, brilinta 90 mg oral twice daily, ranexa 500 mg oral twice daily, rosuvastatin 40 mg oral daily, metoprolol succinate ER 25 mg oral daily, and gemfibrozil 600 mg oral twice daily. Metformin to be held and restart on Saturday 4/21/18. Pt insulin pump was managed by pt's PCP but pt is going to follow up with Dr. Marj Regalado who was consulted during pt's last admission.  Pt will need to call the provided number to make an appointment. Pt deemed stable for discharge per Dr. Cortez and endocrinology and will follow up with Dr. Staley in 1-2 weeks.

## 2018-04-19 NOTE — DISCHARGE NOTE ADULT - PATIENT PORTAL LINK FT
You can access the [facility] Portal, offered by [facility], by registering with the following website: [email or web address]

## 2018-04-19 NOTE — DISCHARGE NOTE ADULT - MEDICATION SUMMARY - MEDICATIONS TO TAKE
I will START or STAY ON the medications listed below when I get home from the hospital:    Aspirin Enteric Coated 81 mg oral delayed release tablet  -- 1 tab(s) by mouth once a day   -- Swallow whole.  Do not crush.  Take with food or milk.    -- Indication: For coronary artery disease, keeps your stents open     quinapril 20 mg oral tablet  -- 1 tab(s) by mouth once a day  -- Indication: For hypertension     ranolazine 500 mg oral tablet, extended release  -- 1 tab(s) by mouth 2 times a day  -- Indication: For coronary artery disease     metFORMIN 1000 mg oral tablet  -- 1 tab(s) by mouth 2 times a day  -- Indication: For PLEASE HOLD AND RESTART ON SATURDAY 4/21/2018, Diabetes     rosuvastatin 40 mg oral tablet  -- tab(s) by mouth once a day (at bedtime)  -- Indication: For hyperlipidemia, coronary artery disease     Lopid 600 mg oral tablet  -- 1 tab(s) by mouth 2 times a day  -- Indication: For hyperlipidemia, high triglycerides, coronary artery disease     ticagrelor 90 mg oral tablet  -- 1 tab(s) by mouth 2 times a day  -- Indication: For coronary artery disease, keeps your stents open     metoprolol succinate 25 mg oral tablet, extended release  -- 1 tab(s) by mouth once a day  -- Indication: For hypertension     Lasix 20 mg oral tablet  -- 1 tab(s) by mouth once a day  -- Indication: For hypertension     Pepcid 20 mg oral tablet  -- 1 tab(s) by mouth 2 times a day  -- Indication: For acid reflux     brimonidine-timolol 0.2%-0.5% ophthalmic solution  -- to each affected eye once a day  -- Indication: For opthalmic     bimatoprost 0.01% ophthalmic solution  -- Indication: For opthalmic     cholecalciferol 2000 intl units oral tablet  -- 1 tab(s) by mouth once a day  -- Indication: For bone health

## 2018-04-19 NOTE — DISCHARGE NOTE ADULT - INSTRUCTIONS
Continued Stay Note  [facility]     Patient Name: Joy Bueno  MRN: [id number]  Today's Date: 11/8/2017    Admit Date: 11/3/2017          Discharge Plan       11/08/17 1504    Case Management/Social Work Plan    Additional Comments Pt reports she does not have a primary care physician. Pt agreeable and SW discussed with CM supervisor about [facility]. SW assisted pt in contacting [facility] and filed out application online for pt to be contacted to be set up with [facility]. SW also was contacted by pt's APS worker after having made a report. Raghavendra Moncada [phone] reports that he opened the case a while ago but nothing has been able to be done as pt is able to make her own decisions. Raghavendra reports he would like to come do a safety assessment on pt before she leaves the hospital and will try ot come see pt tomorrow afternoon.               Discharge Codes     None        Expected Discharge Date and Time     Expected Discharge Date Expected Discharge Time    Nov 8, 2017             ROGELIO Maloney     Please avoid any heavy lifting  (no more than 3 to 5 lbs) or strenuous activity for five days  If you develop any swelling, bleeding, hardening of the skin (hematoma formation), acute pain, numbness/tingling  in your arm or leg please contact your doctor immediately or call our 24/7 line: [phone]

## 2018-04-24 DIAGNOSIS — Z79.82 LONG TERM (CURRENT) USE OF ASPIRIN: ICD-10-CM

## 2018-04-24 DIAGNOSIS — Z95.5 PRESENCE OF CORONARY ANGIOPLASTY IMPLANT AND GRAFT: ICD-10-CM

## 2018-04-24 DIAGNOSIS — E11.42 TYPE 2 DIABETES MELLITUS WITH DIABETIC POLYNEUROPATHY: ICD-10-CM

## 2018-04-24 DIAGNOSIS — E78.5 HYPERLIPIDEMIA, UNSPECIFIED: ICD-10-CM

## 2018-04-24 DIAGNOSIS — I10 ESSENTIAL (PRIMARY) HYPERTENSION: ICD-10-CM

## 2018-04-24 DIAGNOSIS — Z90.49 ACQUIRED ABSENCE OF OTHER SPECIFIED PARTS OF DIGESTIVE TRACT: ICD-10-CM

## 2018-04-24 DIAGNOSIS — E66.01 MORBID (SEVERE) OBESITY DUE TO EXCESS CALORIES: ICD-10-CM

## 2018-04-24 DIAGNOSIS — Z79.4 LONG TERM (CURRENT) USE OF INSULIN: ICD-10-CM

## 2018-04-24 DIAGNOSIS — I25.110 ATHEROSCLEROTIC HEART DISEASE OF NATIVE CORONARY ARTERY WITH UNSTABLE ANGINA PECTORIS: ICD-10-CM

## 2018-04-24 DIAGNOSIS — I51.9 HEART DISEASE, UNSPECIFIED: ICD-10-CM

## 2018-04-24 DIAGNOSIS — H40.9 UNSPECIFIED GLAUCOMA: ICD-10-CM

## 2018-04-24 DIAGNOSIS — R07.9 CHEST PAIN, UNSPECIFIED: ICD-10-CM

## 2018-04-24 DIAGNOSIS — Z96.41 PRESENCE OF INSULIN PUMP (EXTERNAL) (INTERNAL): ICD-10-CM

## 2018-10-09 PROBLEM — Z00.00 ENCOUNTER FOR PREVENTIVE HEALTH EXAMINATION: Status: ACTIVE | Noted: 2018-10-09

## 2022-04-14 RX ORDER — FAMOTIDINE 10 MG/ML
1 INJECTION INTRAVENOUS
Qty: 0 | Refills: 0 | DISCHARGE

## 2022-04-14 RX ORDER — BRIMONIDINE TARTRATE, TIMOLOL MALEATE 2; 5 MG/ML; MG/ML
0 SOLUTION/ DROPS OPHTHALMIC
Qty: 0 | Refills: 0 | DISCHARGE

## 2022-04-14 RX ORDER — CHOLECALCIFEROL (VITAMIN D3) 125 MCG
1 CAPSULE ORAL
Qty: 0 | Refills: 0 | DISCHARGE

## 2022-04-14 RX ORDER — ASPIRIN/CALCIUM CARB/MAGNESIUM 324 MG
1 TABLET ORAL
Qty: 0 | Refills: 0 | DISCHARGE

## 2022-04-14 RX ORDER — BIMATOPROST 0.3 MG/ML
0 SOLUTION/ DROPS OPHTHALMIC
Qty: 0 | Refills: 0 | DISCHARGE

## 2022-04-14 RX ORDER — CLOPIDOGREL BISULFATE 75 MG/1
1 TABLET, FILM COATED ORAL
Qty: 0 | Refills: 0 | DISCHARGE

## 2022-04-14 RX ORDER — GEMFIBROZIL 600 MG
1 TABLET ORAL
Qty: 0 | Refills: 0 | DISCHARGE

## 2022-04-14 RX ORDER — ROSUVASTATIN CALCIUM 5 MG/1
0 TABLET ORAL
Qty: 0 | Refills: 0 | DISCHARGE

## 2022-04-14 RX ORDER — QUINAPRIL HYDROCHLORIDE 40 MG/1
1 TABLET, FILM COATED ORAL
Qty: 0 | Refills: 0 | DISCHARGE

## 2022-04-14 RX ORDER — METFORMIN HYDROCHLORIDE 850 MG/1
1 TABLET ORAL
Qty: 0 | Refills: 0 | DISCHARGE

## 2022-04-14 RX ORDER — FUROSEMIDE 40 MG
1 TABLET ORAL
Qty: 0 | Refills: 0 | DISCHARGE